# Patient Record
Sex: FEMALE | HISPANIC OR LATINO | Employment: UNEMPLOYED | URBAN - METROPOLITAN AREA
[De-identification: names, ages, dates, MRNs, and addresses within clinical notes are randomized per-mention and may not be internally consistent; named-entity substitution may affect disease eponyms.]

---

## 2024-08-27 ENCOUNTER — DOCUMENTATION ONLY (OUTPATIENT)
Dept: FAMILY MEDICINE | Facility: CLINIC | Age: 54
End: 2024-08-27
Payer: MEDICAID

## 2024-09-18 ENCOUNTER — TELEPHONE (OUTPATIENT)
Dept: FAMILY MEDICINE | Facility: CLINIC | Age: 54
End: 2024-09-18
Payer: MEDICAID

## 2024-09-18 NOTE — TELEPHONE ENCOUNTER
Attempted to reach patient - but phone seems to be disconnected. It rang 1x and then gave a dial tone. If pt calls back, please transfer to Marianna Beltre on 9/18/2024 at 3:36 PM

## 2024-09-20 ENCOUNTER — DOCUMENTATION ONLY (OUTPATIENT)
Dept: FAMILY MEDICINE | Facility: CLINIC | Age: 54
End: 2024-09-20
Payer: MEDICAID

## 2024-09-20 NOTE — PROGRESS NOTES
"Ciera (Clinic Services Supervisor) was contacted by  at about 2:09pm, stating that patient Anais and friend Brayan are here in clinic and requesting to speak with Clinic Manager/Supervisor.      Ciera went out to the lobby and introduced herself  and asked Anais what she could do to help them. Anais explained they were here to get a formed filled out so that her friend Brayan can make legal decisions on her behalf because she feels that she is not able to do it for herself. Ciera let Anais know that there is no form here in clinic that would ed Brayan that kind of authority over her. She let her know that they would need to go through the legal process in order for that to happen. Brayan, who was sitting across from Anais, interrupted Anais and demanded that she look at her while she is talking to her. Brayan complained about the visit from yesterday and how everything that happened yesterday was a \"scam\". Brayan insinuated that JOVANNA Cabral was not doing her job and stated that she felt Marianna influenced Dr. SONIA Oliva's care to Anais. She then stated that other healthcare systems have not made it this \"complicated\" and \"difficult\" for them. She's states that other organization that they have gone to only took \"a simple form\" to be filled out and that granted Brayan to make decisions on Anais's behalf. Ciera told Brayan that she could not speak on other organization or what happened in yesterday's visit, however, it is HIPAA and it is the law.      Ciera redirect the conversation to Anais and then Anais asked about her referral to [facility]. She let Anais know that the referral to [facility] has been entered and will take a few days to process and she would receive a call. Brayan continued to interrupt me and make comments such as \"see, they're very busy. They'll call you when they can. They have a lot of patients to get through\". Brayan then continued to tell me about how her and Anais became apart of each other's lives and listed her " "own medical conditions that included MS, TMJ, and Mental Health problems and why she's fit to make decisions for Anais.      After a few minutes, Ciera apologized for interrupting Brayan's story and redirected the conversation. She informed them once again, without any legal documentation, we would not be able to allow Brayan to make any decisions or talk about Anais's health with Brayan. She told her she understands that she may be trying to help Anais, but at this time, Anais is her own self and so is Brayan. She let her know that would not be repeating herself again. Ciera encouraged them to make a follow up appointment if they would like, however, if they were not going to make a follow up appointment, there was nothing else that we could do to assist them and Ciera asked that they leave. Brayan continued to make comments such as, \"She don't want Marianna or Dr. Oliva. She wants a different doctor.\". Ciera told her that would up to Anais and she can make the appointment at the .      Brayan then accused Ciera of being disrespectful and discriminating to her because of her health and \"kicking them out\". She complained about how she was in pain and that Ciera was not trying to \"understand or sympathize\" with her. She continued by saying that she's never been treated so \"rude\" in her life. She then added that she never offered her to make an appointment and that Ciera was a \"liar\". Cirea corrected Brayan. She let her know that she never disrespected or discriminated her and that she had offered for them to make appointments, if not here in clinic, they are more than welcome to call and schedule. I then ended the conversation and came back to my office.      They both stayed in clinic until about 3:15pm before leaving. Neither of them made an appointment.      Ciera Owen on 8/27/2024 at 4:55 PM  "

## 2024-09-20 NOTE — PROGRESS NOTES
Brayan arrived to our clinic at about 11:15am with her  Anais. Our  saw them arrive on camera and went out to the parking lot to meet them. He informed Brayan that she did not have an appointment today, and so she could not come into the building. She said she wanted to speak to me, so I went out to the parking lot.     I informed Brayan that we would not be taking her on as a patient here and that she could not come into the building today as she did not have an appt. I spoke to Anais and offered to have her come inside with me to discuss her options for remaining a patient. Brayan would not let Anais speak and repeated the behaviors we'd seen in our prior interactions (and documented in Anais's chart). I told both of them that I was cancelling their appts on 10/1 and again informed Anais that if she changed her mind and would like to speak with me 1:1, she could call me any time.     Brayan got back into their vehicle, and they left without further incident.     Anais may not schedule future appts at [place] without meeting 1:1 w/clinic manager.     Marianna Beltre on 9/20/2024 at 11:53 AM

## 2024-09-27 ENCOUNTER — MEDICAL CORRESPONDENCE (OUTPATIENT)
Dept: HEALTH INFORMATION MANAGEMENT | Facility: CLINIC | Age: 54
End: 2024-09-27
Payer: MEDICAID

## 2024-10-14 ENCOUNTER — OFFICE VISIT (OUTPATIENT)
Dept: FAMILY MEDICINE | Facility: CLINIC | Age: 54
End: 2024-10-14
Payer: MEDICAID

## 2024-10-14 VITALS
HEART RATE: 95 BPM | WEIGHT: 180 LBS | HEIGHT: 60 IN | OXYGEN SATURATION: 100 % | BODY MASS INDEX: 35.34 KG/M2 | RESPIRATION RATE: 21 BRPM | TEMPERATURE: 97.6 F | DIASTOLIC BLOOD PRESSURE: 85 MMHG | SYSTOLIC BLOOD PRESSURE: 134 MMHG

## 2024-10-14 DIAGNOSIS — F09 COGNITIVE DISORDER: ICD-10-CM

## 2024-10-14 DIAGNOSIS — Z12.4 CERVICAL CANCER SCREENING: ICD-10-CM

## 2024-10-14 DIAGNOSIS — Z87.828 H/O HEAD INJURY: ICD-10-CM

## 2024-10-14 DIAGNOSIS — R63.8 DIFFICULTY EATING: ICD-10-CM

## 2024-10-14 DIAGNOSIS — Z11.4 SCREENING FOR HIV (HUMAN IMMUNODEFICIENCY VIRUS): ICD-10-CM

## 2024-10-14 DIAGNOSIS — E55.9 VITAMIN D DEFICIENCY: ICD-10-CM

## 2024-10-14 DIAGNOSIS — E11.65 TYPE 2 DIABETES MELLITUS WITH HYPERGLYCEMIA, WITHOUT LONG-TERM CURRENT USE OF INSULIN (H): ICD-10-CM

## 2024-10-14 DIAGNOSIS — M79.10 MYALGIA: ICD-10-CM

## 2024-10-14 DIAGNOSIS — E66.812 CLASS 2 SEVERE OBESITY DUE TO EXCESS CALORIES WITH SERIOUS COMORBIDITY AND BODY MASS INDEX (BMI) OF 35.0 TO 35.9 IN ADULT (H): ICD-10-CM

## 2024-10-14 DIAGNOSIS — Z11.59 NEED FOR HEPATITIS C SCREENING TEST: ICD-10-CM

## 2024-10-14 DIAGNOSIS — R47.9 DIFFICULTY WITH SPEECH: ICD-10-CM

## 2024-10-14 DIAGNOSIS — Z23 ENCOUNTER FOR IMMUNIZATION: ICD-10-CM

## 2024-10-14 DIAGNOSIS — Z11.59 NEED FOR HEPATITIS B SCREENING TEST: ICD-10-CM

## 2024-10-14 DIAGNOSIS — Z78.9 VEGAN: ICD-10-CM

## 2024-10-14 DIAGNOSIS — R06.2 WHEEZING: ICD-10-CM

## 2024-10-14 DIAGNOSIS — R76.8 ELEVATED ANTINUCLEAR ANTIBODY (ANA) LEVEL: ICD-10-CM

## 2024-10-14 DIAGNOSIS — Z13.220 LIPID SCREENING: ICD-10-CM

## 2024-10-14 DIAGNOSIS — Z12.11 SCREEN FOR COLON CANCER: ICD-10-CM

## 2024-10-14 DIAGNOSIS — R13.10 DYSPHAGIA, UNSPECIFIED TYPE: ICD-10-CM

## 2024-10-14 DIAGNOSIS — F90.2 ATTENTION DEFICIT HYPERACTIVITY DISORDER (ADHD), COMBINED TYPE: ICD-10-CM

## 2024-10-14 DIAGNOSIS — M26.609 TMJ (TEMPOROMANDIBULAR JOINT SYNDROME): ICD-10-CM

## 2024-10-14 DIAGNOSIS — E66.01 CLASS 2 SEVERE OBESITY DUE TO EXCESS CALORIES WITH SERIOUS COMORBIDITY AND BODY MASS INDEX (BMI) OF 35.0 TO 35.9 IN ADULT (H): ICD-10-CM

## 2024-10-14 DIAGNOSIS — R51.9 CHRONIC DAILY HEADACHE: ICD-10-CM

## 2024-10-14 DIAGNOSIS — Z28.21 NO VACCINATION-PT REFUSE: ICD-10-CM

## 2024-10-14 DIAGNOSIS — Z12.31 VISIT FOR SCREENING MAMMOGRAM: Primary | ICD-10-CM

## 2024-10-14 DIAGNOSIS — F39 MOOD DISORDER (H): ICD-10-CM

## 2024-10-14 LAB
BASOPHILS # BLD AUTO: 0 10E3/UL (ref 0–0.2)
BASOPHILS NFR BLD AUTO: 0 %
EOSINOPHIL # BLD AUTO: 0.1 10E3/UL (ref 0–0.7)
EOSINOPHIL NFR BLD AUTO: 1 %
ERYTHROCYTE [DISTWIDTH] IN BLOOD BY AUTOMATED COUNT: 11.4 % (ref 10–15)
ERYTHROCYTE [SEDIMENTATION RATE] IN BLOOD BY WESTERGREN METHOD: 17 MM/HR (ref 0–30)
EST. AVERAGE GLUCOSE BLD GHB EST-MCNC: 220 MG/DL
HBA1C MFR BLD: 9.3 % (ref 0–5.6)
HCT VFR BLD AUTO: 42.5 % (ref 35–47)
HGB BLD-MCNC: 14.5 G/DL (ref 11.7–15.7)
IMM GRANULOCYTES # BLD: 0 10E3/UL
IMM GRANULOCYTES NFR BLD: 0 %
LYMPHOCYTES # BLD AUTO: 2.4 10E3/UL (ref 0.8–5.3)
LYMPHOCYTES NFR BLD AUTO: 27 %
MCH RBC QN AUTO: 28.4 PG (ref 26.5–33)
MCHC RBC AUTO-ENTMCNC: 34.1 G/DL (ref 31.5–36.5)
MCV RBC AUTO: 83 FL (ref 78–100)
MONOCYTES # BLD AUTO: 0.5 10E3/UL (ref 0–1.3)
MONOCYTES NFR BLD AUTO: 5 %
NEUTROPHILS # BLD AUTO: 6 10E3/UL (ref 1.6–8.3)
NEUTROPHILS NFR BLD AUTO: 67 %
PLATELET # BLD AUTO: 320 10E3/UL (ref 150–450)
RBC # BLD AUTO: 5.11 10E6/UL (ref 3.8–5.2)
WBC # BLD AUTO: 9 10E3/UL (ref 4–11)

## 2024-10-14 PROCEDURE — 84165 PROTEIN E-PHORESIS SERUM: CPT | Performed by: PATHOLOGY

## 2024-10-14 PROCEDURE — 84443 ASSAY THYROID STIM HORMONE: CPT | Performed by: FAMILY MEDICINE

## 2024-10-14 PROCEDURE — 85025 COMPLETE CBC W/AUTO DIFF WBC: CPT | Performed by: FAMILY MEDICINE

## 2024-10-14 PROCEDURE — 86803 HEPATITIS C AB TEST: CPT | Performed by: FAMILY MEDICINE

## 2024-10-14 PROCEDURE — 82607 VITAMIN B-12: CPT | Performed by: FAMILY MEDICINE

## 2024-10-14 PROCEDURE — 86225 DNA ANTIBODY NATIVE: CPT | Performed by: FAMILY MEDICINE

## 2024-10-14 PROCEDURE — 36415 COLL VENOUS BLD VENIPUNCTURE: CPT | Performed by: FAMILY MEDICINE

## 2024-10-14 PROCEDURE — 83001 ASSAY OF GONADOTROPIN (FSH): CPT | Performed by: FAMILY MEDICINE

## 2024-10-14 PROCEDURE — 86235 NUCLEAR ANTIGEN ANTIBODY: CPT | Mod: 59 | Performed by: FAMILY MEDICINE

## 2024-10-14 PROCEDURE — 86431 RHEUMATOID FACTOR QUANT: CPT | Performed by: FAMILY MEDICINE

## 2024-10-14 PROCEDURE — 87389 HIV-1 AG W/HIV-1&-2 AB AG IA: CPT | Performed by: FAMILY MEDICINE

## 2024-10-14 PROCEDURE — 82550 ASSAY OF CK (CPK): CPT | Performed by: FAMILY MEDICINE

## 2024-10-14 PROCEDURE — 86235 NUCLEAR ANTIGEN ANTIBODY: CPT | Performed by: FAMILY MEDICINE

## 2024-10-14 PROCEDURE — 83036 HEMOGLOBIN GLYCOSYLATED A1C: CPT | Performed by: FAMILY MEDICINE

## 2024-10-14 PROCEDURE — 82306 VITAMIN D 25 HYDROXY: CPT | Performed by: FAMILY MEDICINE

## 2024-10-14 PROCEDURE — 86039 ANTINUCLEAR ANTIBODIES (ANA): CPT | Performed by: FAMILY MEDICINE

## 2024-10-14 PROCEDURE — 86200 CCP ANTIBODY: CPT | Performed by: FAMILY MEDICINE

## 2024-10-14 PROCEDURE — 80053 COMPREHEN METABOLIC PANEL: CPT | Performed by: FAMILY MEDICINE

## 2024-10-14 PROCEDURE — 86706 HEP B SURFACE ANTIBODY: CPT | Performed by: FAMILY MEDICINE

## 2024-10-14 PROCEDURE — 86140 C-REACTIVE PROTEIN: CPT | Performed by: FAMILY MEDICINE

## 2024-10-14 PROCEDURE — 84155 ASSAY OF PROTEIN SERUM: CPT | Mod: 59 | Performed by: FAMILY MEDICINE

## 2024-10-14 PROCEDURE — 85652 RBC SED RATE AUTOMATED: CPT | Performed by: FAMILY MEDICINE

## 2024-10-14 PROCEDURE — 80061 LIPID PANEL: CPT | Performed by: FAMILY MEDICINE

## 2024-10-14 PROCEDURE — 99204 OFFICE O/P NEW MOD 45 MIN: CPT | Performed by: FAMILY MEDICINE

## 2024-10-14 PROCEDURE — 86780 TREPONEMA PALLIDUM: CPT | Performed by: FAMILY MEDICINE

## 2024-10-14 PROCEDURE — 86038 ANTINUCLEAR ANTIBODIES: CPT | Performed by: FAMILY MEDICINE

## 2024-10-14 ASSESSMENT — ANXIETY QUESTIONNAIRES
4. TROUBLE RELAXING: SEVERAL DAYS
3. WORRYING TOO MUCH ABOUT DIFFERENT THINGS: MORE THAN HALF THE DAYS
5. BEING SO RESTLESS THAT IT IS HARD TO SIT STILL: SEVERAL DAYS
IF YOU CHECKED OFF ANY PROBLEMS ON THIS QUESTIONNAIRE, HOW DIFFICULT HAVE THESE PROBLEMS MADE IT FOR YOU TO DO YOUR WORK, TAKE CARE OF THINGS AT HOME, OR GET ALONG WITH OTHER PEOPLE: SOMEWHAT DIFFICULT
6. BECOMING EASILY ANNOYED OR IRRITABLE: SEVERAL DAYS
GAD7 TOTAL SCORE: 10
8. IF YOU CHECKED OFF ANY PROBLEMS, HOW DIFFICULT HAVE THESE MADE IT FOR YOU TO DO YOUR WORK, TAKE CARE OF THINGS AT HOME, OR GET ALONG WITH OTHER PEOPLE?: SOMEWHAT DIFFICULT
7. FEELING AFRAID AS IF SOMETHING AWFUL MIGHT HAPPEN: SEVERAL DAYS
GAD7 TOTAL SCORE: 10
1. FEELING NERVOUS, ANXIOUS, OR ON EDGE: MORE THAN HALF THE DAYS
7. FEELING AFRAID AS IF SOMETHING AWFUL MIGHT HAPPEN: SEVERAL DAYS
2. NOT BEING ABLE TO STOP OR CONTROL WORRYING: MORE THAN HALF THE DAYS
GAD7 TOTAL SCORE: 10

## 2024-10-14 ASSESSMENT — PATIENT HEALTH QUESTIONNAIRE - PHQ9
SUM OF ALL RESPONSES TO PHQ QUESTIONS 1-9: 16
10. IF YOU CHECKED OFF ANY PROBLEMS, HOW DIFFICULT HAVE THESE PROBLEMS MADE IT FOR YOU TO DO YOUR WORK, TAKE CARE OF THINGS AT HOME, OR GET ALONG WITH OTHER PEOPLE: VERY DIFFICULT
SUM OF ALL RESPONSES TO PHQ QUESTIONS 1-9: 16
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
## 2024-10-14 NOTE — LETTER
My Depression Action Plan  Name: Brayan Baires   Date of Birth 1970  Date: 10/15/2024    My doctor: Carolynn Correa   My clinic: [facility] [address] [place] [id number] [phone]            GREEN    ZONE   Good Control    What it looks like:   Things are going generally well. You have normal ups and downs. You may even feel depressed from time to time, but bad moods usually last less than a day.   What you need to do:  Continue to care for yourself (see self care plan)  Check your depression survival kit and update it as needed  Follow your physician s recommendations including any medication.  Do not stop taking medication unless you consult with your physician first.             YELLOW         ZONE Getting Worse    What it looks like:   Depression is starting to interfere with your life.   It may be hard to get out of bed; you may be starting to isolate yourself from others.  Symptoms of depression are starting to last most all day and this has happened for several days.   You may have suicidal thoughts but they are not constant.   What you need to do:     Call your care team. Your response to treatment will improve if you keep your care team informed of your progress. Yellow periods are signs an adjustment may need to be made.     Continue your self-care.  Just get dressed and ready for the day.  Don't give yourself time to talk yourself out of it.    Talk to someone in your support network.    Open up your Depression Self-Care Plan/Wellness Kit.             RED    ZONE Medical Alert - Get Help    What it looks like:   Depression is seriously interfering with your life.   You may experience these or other symptoms: You can t get out of bed most days, can t work or engage in other necessary activities, you have trouble taking care of basic hygiene, or basic responsibilities, thoughts of suicide or death that will not go away, self-injurious behavior.      What you need to do:  Call your care team and request a same-day appointment. If they are not available (weekends or after hours) call your local crisis line, emergency room or 911.          Depression Self-Care Plan / Wellness Kit    Many people find that medication and therapy are helpful treatments for managing depression. In addition, making small changes to your everyday life can help to boost your mood and improve your wellbeing. Below are some tips for you to consider. Be sure to talk with your medical provider and/or behavioral health consultant if your symptoms are worsening or not improving.     Sleep   Sleep hygiene  means all of the habits that support good, restful sleep. It includes maintaining a consistent bedtime and wake time, using your bedroom only for sleeping or sex, and keeping the bedroom dark and free of distractions like a computer, smartphone, or television.     Develop a Healthy Routine  Maintain good hygiene. Get out of bed in the morning, make your bed, brush your teeth, take a shower, and get dressed. Don t spend too much time viewing media that makes you feel stressed. Find time to relax each day.    Exercise  Get some form of exercise every day. This will help reduce pain and release endorphins, the  feel good  chemicals in your brain. It can be as simple as just going for a walk or doing some gardening, anything that will get you moving.      Diet  Strive to eat healthy foods, including fruits and vegetables. Drink plenty of water. Avoid excessive sugar, caffeine, alcohol, and other mood-altering substances.     Stay Connected with Others  Stay in touch with friends and family members.    Manage Your Mood  Try deep breathing, massage therapy, biofeedback, or meditation. Take part in fun activities when you can. Try to find something to smile about each day.     Psychotherapy  Be open to working with a therapist if your provider recommends it.     Medication  Be sure to take your  medication as prescribed. Most anti-depressants need to be taken every day. It usually takes several weeks for medications to work. Not all medicines work for all people. It is important to follow-up with your provider to make sure you have a treatment plan that is working for you. Do not stop your medication abruptly without first discussing it with your provider.    Crisis Resources   These hotlines are for both adults and children. They and are open 24 hours a day, 7 days a week unless noted otherwise.    National Suicide Prevention Lifeline   988 or 5-752-716-IIAT (8774)    Crisis Text Line    www.crisistextline.org  Text HOME to 150446 from anywhere in the [place], anytime, about any type of crisis. A live, trained crisis counselor will receive the text and respond quickly.    [place] Lifeline for LGBTQ Youth  A national crisis intervention and suicide lifeline for LGBTQ youth under 25. Provides a safe place to talk without judgement. Call 1-566.435.7740; text START to 615780 or visit www.[facility].org to talk to a trained counselor.    For [facility] crisis numbers, visit the [facility] website at:  https://[place].gov/dhs/people-we-serve/adults/health-care/mental-health/resources/crisis-contacts.jsp

## 2024-10-14 NOTE — LETTER
October 25, 2024      Brayan Baires  [address]   [place] [address]        Dear ,    We are writing to inform you of your test results.    Your tests show that you have Diabetes- please be sure to contact us so that we can help you get this under control so you feel better.     Your cholesterol is a little high    You are NOT IMMUNE to hepatitis B     Your B12 is on the low end of normal- starting a B complex may help you feel better.      Your vitamin D is low- be sure to start a daily vitamin D to help your body feel better.  1000units should be good.      Other tests all look normal.     Resulted Orders   HIV Antigen Antibody Combo   Result Value Ref Range    HIV Antigen Antibody Combo Nonreactive Nonreactive      Comment:      Negative HIV-1 p24 antigen and HIV-1/2 antibody screening test results usually indicate the absence of HIV-1 and HIV-2 infection. However, such negative results do not rule-out acute HIV infection.  If acute HIV-1 or HIV-2 infection is suspected, detection of HIV-1 or HIV-2 RNA  is recommended. This result is obtained using the Roche Elecsys HIV Duo method on the hamzah e801 immunoassay analyzer.   Hepatitis C Screen Reflex to HCV RNA Quant and Genotype   Result Value Ref Range    Hepatitis C Antibody Nonreactive Nonreactive      Comment:      A nonreactive screening test result does not exclude the possibility of exposure to or infection with HCV. Nonreactive screening test results in individuals with prior exposure to HCV may be due to antibody levels below the limit of detection of this assay or lack of reactivity to the HCV antigens used in this assay. Patients with recent HCV infections (<3 months from time of exposure) may have false-negative HCV antibody results due to the time needed for seroconversion (average of 8 to 9 weeks).   Lipid panel reflex to direct LDL Non-fasting   Result Value Ref Range    Cholesterol 208 (H) <200 mg/dL    Triglycerides 109 <150  mg/dL    Direct Measure HDL 45 (L) >=50 mg/dL    LDL Cholesterol Calculated 141 (H) <100 mg/dL    Non HDL Cholesterol 163 (H) <130 mg/dL    Patient Fasting > 8hrs? No     Narrative    Cholesterol  Desirable: < 200 mg/dL  Borderline High: 200 - 239 mg/dL  High: >= 240 mg/dL    Triglycerides  Normal: < 150 mg/dL  Borderline High: 150 - 199 mg/dL  High: 200-499 mg/dL  Very High: >= 500 mg/dL    Direct Measure HDL  Female: >= 50 mg/dL   Male: >= 40 mg/dL    LDL Cholesterol  Desirable: < 100 mg/dL  Above Desirable: 100 - 129 mg/dL   Borderline High: 130 - 159 mg/dL   High:  160 - 189 mg/dL   Very High: >= 190 mg/dL    Non HDL Cholesterol  Desirable: < 130 mg/dL  Above Desirable: 130 - 159 mg/dL  Borderline High: 160 - 189 mg/dL  High: 190 - 219 mg/dL  Very High: >= 220 mg/dL   Comprehensive metabolic panel (BMP + Alb, Alk Phos, ALT, AST, Total. Bili, TP)   Result Value Ref Range    Sodium 138 135 - 145 mmol/L    Potassium 4.0 3.4 - 5.3 mmol/L    Carbon Dioxide (CO2) 21 (L) 22 - 29 mmol/L    Anion Gap 14 7 - 15 mmol/L    Urea Nitrogen 11.8 6.0 - 20.0 mg/dL    Creatinine 0.44 (L) 0.51 - 0.95 mg/dL    GFR Estimate >90 >60 mL/min/1.73m2      Comment:      eGFR calculated using 2021 CKD-EPI equation.    Calcium 9.9 8.8 - 10.4 mg/dL      Comment:      Reference intervals for this test were updated on 7/16/2024 to reflect our healthy population more accurately. There may be differences in the flagging of prior results with similar values performed with this method. Those prior results can be interpreted in the context of the updated reference intervals.    Chloride 103 98 - 107 mmol/L    Glucose 236 (H) 70 - 99 mg/dL    Alkaline Phosphatase 93 40 - 150 U/L    AST 25 0 - 45 U/L    ALT 28 0 - 50 U/L    Protein Total 7.7 6.4 - 8.3 g/dL    Albumin 4.4 3.5 - 5.2 g/dL    Bilirubin Total 0.5 <=1.2 mg/dL    Patient Fasting > 8hrs? No    Hepatitis B Surface Antibody   Result Value Ref Range    Hepatitis B Surface Antibody Nonreactive        Comment:      Nonreactive results, defined as anti-HBs levels of less than 8.5 mIU/mL, indicate a lack of recovery from acute or chronic hepatitis B or inadequate immune response to HBV vaccination.    Hepatitis B Surface Antibody Instrument Value <3.50 <8.5 m[IU]/mL   ESR: Erythrocyte sedimentation rate   Result Value Ref Range    Erythrocyte Sedimentation Rate 17 0 - 30 mm/hr   CRP, inflammation   Result Value Ref Range    CRP Inflammation 12.10 (H) <5.00 mg/L   Anti Nuclear Gregoria IgG by IFA with Reflex   Result Value Ref Range    ASHLEY interpretation Borderline Positive (A) Negative      Comment:        Negative:              <1:40  Borderline Positive:   1:40 - 1:80  Positive:              >1:80    ASHLEY pattern 1 Speckled     ASHLEY titer 1 1:40    TSH with free T4 reflex   Result Value Ref Range    TSH 1.70 0.30 - 4.20 uIU/mL   Vitamin B12   Result Value Ref Range    Vitamin B12 342 232 - 1,245 pg/mL   Treponema Abs w Reflex to RPR and Titer   Result Value Ref Range    Treponema Antibody Total Nonreactive Nonreactive   Hemoglobin A1c   Result Value Ref Range    Estimated Average Glucose 220 (H) <117 mg/dL    Hemoglobin A1C 9.3 (H) 0.0 - 5.6 %      Comment:      Normal <5.7%   Prediabetes 5.7-6.4%    Diabetes 6.5% or higher     Note: Adopted from ADA consensus guidelines.    Narrative    Results confirmed by repeat test.    CBC with platelets and differential   Result Value Ref Range    WBC Count 9.0 4.0 - 11.0 10e3/uL    RBC Count 5.11 3.80 - 5.20 10e6/uL    Hemoglobin 14.5 11.7 - 15.7 g/dL    Hematocrit 42.5 35.0 - 47.0 %    MCV 83 78 - 100 fL    MCH 28.4 26.5 - 33.0 pg    MCHC 34.1 31.5 - 36.5 g/dL    RDW 11.4 10.0 - 15.0 %    Platelet Count 320 150 - 450 10e3/uL    % Neutrophils 67 %    % Lymphocytes 27 %    % Monocytes 5 %    % Eosinophils 1 %    % Basophils 0 %    % Immature Granulocytes 0 %    Absolute Neutrophils 6.0 1.6 - 8.3 10e3/uL    Absolute Lymphocytes 2.4 0.8 - 5.3 10e3/uL    Absolute Monocytes 0.5  0.0 - 1.3 10e3/uL    Absolute Eosinophils 0.1 0.0 - 0.7 10e3/uL    Absolute Basophils 0.0 0.0 - 0.2 10e3/uL    Absolute Immature Granulocytes 0.0 <=0.4 10e3/uL   SSB La JANETH Antibody IgG   Result Value Ref Range    SSB Gregoria IgG Instrument Value <0.6 <7.0 U/mL    SSB (La) Antibody IgG Negative Negative   SSA Ro JANETH Antibody IgG   Result Value Ref Range    SSA Gregoria IgG Instrument Value <0.5 <7.0 U/mL    SSA (Ro) Antibody IgG Negative Negative   Smith JANETH Antibody IgG   Result Value Ref Range    Mcpherson JANETH Gregoria IgG Instrument Value <0.7 <7.0 U/mL    Smith JANETH Antibody IgG Negative Negative   Rheumatoid factor   Result Value Ref Range    Rheumatoid Factor <10 <14 IU/mL   CK total   Result Value Ref Range    CK 48 26 - 192 U/L   Scleroderma Antibody Scl70 JANETH IgG   Result Value Ref Range    Scl-70 Gregoria IgG Instrument Value <0.6 <7.0 U/mL    Scl-70 Antibody IgG Negative Negative   Cyclic Citrullinated Peptide Antibody IgG   Result Value Ref Range    Cyclic Citrullinated Peptide Antibody IgG <0.4 <7.0 U/mL      Comment:      Negative   DNA double stranded antibodies   Result Value Ref Range    DNA (ds) Antibody <0.6 <10.0 IU/mL      Comment:      Negative    Narrative    Negative:  Less than 10  Equivocal: 10-15  Positive:  Greater than 15   Follicle stimulating hormone   Result Value Ref Range    FSH 42.2 mIU/mL      Comment:      19 years and older:   Follicular phase: 3.5-12.5 mIU/mL   Ovulation phase: 4.7-21.5 mIU/mL   Luteal phase: 1.7-7.7 mIU/mL   Postmenopause: 25.8-134.8 mIU/mL      Total Protein, Serum for ELP   Result Value Ref Range    Total Protein Serum for ELP 7.4 6.4 - 8.3 g/dL   Protein Electrophoresis, Serum   Result Value Ref Range    Albumin 4.4 3.7 - 5.1 g/dL    Alpha 1 0.3 0.2 - 0.4 g/dL    Alpha 2 0.8 0.5 - 0.9 g/dL    Beta Globulin 1.1 (H) 0.6 - 1.0 g/dL    Gamma Globulin 0.9 0.7 - 1.6 g/dL    Monoclonal Peak 0.0 <=0.0 g/dL    ELP Interpretation       Essentially normal electrophoretic pattern. No obvious  monoclonal proteins seen. Pathologic significance requires clinical correlation. Suad Jones M.D.   Vitamin D Deficiency   Result Value Ref Range    Vitamin D, Total (25-Hydroxy) 18 (L) 20 - 50 ng/mL      Comment:      mild to moderate deficiency    Narrative    Season, race, dietary intake, and treatment affect the concentration of 25-hydroxy-Vitamin D. Values may decrease during winter months and increase during summer months.    Vitamin D determination is routinely performed by an immunoassay specific for 25 hydroxyvitamin D3.  If an individual is on vitamin D2(ergocalciferol) supplementation, please specify 25 OH vitamin D2 and D3 level determination by LCMSMS test VITD23.         If you have any questions or concerns, please call the clinic at the number listed above.       Sincerely,      Carolynn Correa MD

## 2024-10-15 ENCOUNTER — PATIENT OUTREACH (OUTPATIENT)
Dept: CARE COORDINATION | Facility: CLINIC | Age: 54
End: 2024-10-15
Payer: MEDICAID

## 2024-10-15 ENCOUNTER — TELEPHONE (OUTPATIENT)
Dept: FAMILY MEDICINE | Facility: CLINIC | Age: 54
End: 2024-10-15
Payer: MEDICAID

## 2024-10-15 PROBLEM — R51.9 CHRONIC DAILY HEADACHE: Status: ACTIVE | Noted: 2024-10-15

## 2024-10-15 PROBLEM — E66.01 CLASS 2 SEVERE OBESITY DUE TO EXCESS CALORIES WITH SERIOUS COMORBIDITY IN ADULT (H): Status: ACTIVE | Noted: 2024-10-15

## 2024-10-15 PROBLEM — F39 MOOD DISORDER (H): Status: ACTIVE | Noted: 2024-10-15

## 2024-10-15 PROBLEM — R06.2 WHEEZING: Status: ACTIVE | Noted: 2024-10-15

## 2024-10-15 PROBLEM — E11.65 TYPE 2 DIABETES MELLITUS WITH HYPERGLYCEMIA, WITHOUT LONG-TERM CURRENT USE OF INSULIN (H): Status: ACTIVE | Noted: 2024-10-15

## 2024-10-15 PROBLEM — M79.10 MYALGIA: Status: ACTIVE | Noted: 2024-10-15

## 2024-10-15 PROBLEM — R13.10 DYSPHAGIA: Status: ACTIVE | Noted: 2024-10-15

## 2024-10-15 PROBLEM — E66.812 CLASS 2 SEVERE OBESITY DUE TO EXCESS CALORIES WITH SERIOUS COMORBIDITY IN ADULT (H): Status: ACTIVE | Noted: 2024-10-15

## 2024-10-15 PROBLEM — R76.8 ELEVATED ANTINUCLEAR ANTIBODY (ANA) LEVEL: Status: ACTIVE | Noted: 2024-10-15

## 2024-10-15 PROBLEM — Z28.21 NO VACCINATION-PT REFUSE: Status: ACTIVE | Noted: 2024-10-15

## 2024-10-15 LAB
ALBUMIN SERPL BCG-MCNC: 4.4 G/DL (ref 3.5–5.2)
ALP SERPL-CCNC: 93 U/L (ref 40–150)
ALT SERPL W P-5'-P-CCNC: 28 U/L (ref 0–50)
ANA PAT SER IF-IMP: ABNORMAL
ANA SER QL IF: ABNORMAL
ANA TITR SER IF: ABNORMAL {TITER}
ANION GAP SERPL CALCULATED.3IONS-SCNC: 14 MMOL/L (ref 7–15)
AST SERPL W P-5'-P-CCNC: 25 U/L (ref 0–45)
BILIRUB SERPL-MCNC: 0.5 MG/DL
BUN SERPL-MCNC: 11.8 MG/DL (ref 6–20)
CALCIUM SERPL-MCNC: 9.9 MG/DL (ref 8.8–10.4)
CHLORIDE SERPL-SCNC: 103 MMOL/L (ref 98–107)
CHOLEST SERPL-MCNC: 208 MG/DL
CK SERPL-CCNC: 48 U/L (ref 26–192)
CREAT SERPL-MCNC: 0.44 MG/DL (ref 0.51–0.95)
CRP SERPL-MCNC: 12.1 MG/L
EGFRCR SERPLBLD CKD-EPI 2021: >90 ML/MIN/1.73M2
FASTING STATUS PATIENT QL REPORTED: NO
FASTING STATUS PATIENT QL REPORTED: NO
FSH SERPL IRP2-ACNC: 42.2 MIU/ML
GLUCOSE SERPL-MCNC: 236 MG/DL (ref 70–99)
HBV SURFACE AB SERPL IA-ACNC: <3.5 M[IU]/ML
HBV SURFACE AB SERPL IA-ACNC: NONREACTIVE M[IU]/ML
HCO3 SERPL-SCNC: 21 MMOL/L (ref 22–29)
HCV AB SERPL QL IA: NONREACTIVE
HDLC SERPL-MCNC: 45 MG/DL
HIV 1+2 AB+HIV1 P24 AG SERPL QL IA: NONREACTIVE
LDLC SERPL CALC-MCNC: 141 MG/DL
NONHDLC SERPL-MCNC: 163 MG/DL
POTASSIUM SERPL-SCNC: 4 MMOL/L (ref 3.4–5.3)
PROT SERPL-MCNC: 7.7 G/DL (ref 6.4–8.3)
RHEUMATOID FACT SERPL-ACNC: <10 IU/ML
SODIUM SERPL-SCNC: 138 MMOL/L (ref 135–145)
T PALLIDUM AB SER QL: NONREACTIVE
TOTAL PROTEIN SERUM FOR ELP: 7.4 G/DL (ref 6.4–8.3)
TRIGL SERPL-MCNC: 109 MG/DL
TSH SERPL DL<=0.005 MIU/L-ACNC: 1.7 UIU/ML (ref 0.3–4.2)
VIT B12 SERPL-MCNC: 342 PG/ML (ref 232–1245)
VIT D+METAB SERPL-MCNC: 18 NG/ML (ref 20–50)

## 2024-10-15 RX ORDER — LANCETS
EACH MISCELLANEOUS
Qty: 100 EACH | Refills: 6 | Status: SHIPPED | OUTPATIENT
Start: 2024-10-15

## 2024-10-15 NOTE — TELEPHONE ENCOUNTER
----- Message from Carolynn Correa sent at 10/15/2024 12:28 PM CDT -----  Team - please call patient with results and send result letter with this information if patient desires for their records. Refer to [facility] result note as needed.      Her sugars are VERY high- she has diabetes.   I ordered appointment with our diabetes educator- hopefully she can meet with her this month.    I ordered a glucose meter to start checking once a day alternating between fasting and bedtime     A couple of her tests is a little high- I am going to check for autoimmune problems causing her symptoms.  Maybe her body is attacking her muscles, joints, throat causing problems for her.      I ordered xray of her back and chest based on some labs- please help schedule at clinic     I would like her to see an eye doctor- this was ordered.     I would like her to see ENT about her throat and TMJ to start with.      Her kidney and liver tests are healthy.     Normal thyroid level     Normal blood counts- no anemia     She is NOT IMMUNE to hepatitis B and should start this 3 shot series to better protect her body.

## 2024-10-15 NOTE — TELEPHONE ENCOUNTER
First attempt: Phone number not accepting calls, no option to leave voicemail/callback number.    Please make additional attempts to contact patient at a future time.    Shyanne Woodall RN  [facility]

## 2024-10-15 NOTE — LETTER
[facility]  [address] [facility] [address]    October 24, 2024    Brayan Baires  [address]   [place] [address]      Dear Brayan,    I am a clinic community health worker who works with Carolynn Correa MD with the [facility]. I have been trying to reach you recently to introduce Clinic Care Coordination. Below is a description of clinic care coordination and how I can further assist you.       The clinic care coordination team is made up of a registered nurse, , financial resource worker and community health worker who understand the health care system. The goal of clinic care coordination is to help you manage your health and improve access to the health care system. Our team works alongside your provider to assist you in determining your health and social needs. We can help you obtain health care and community resources, providing you with necessary information and education. We can work with you through any barriers and develop a care plan that helps coordinate and strengthen the communication between you and your care team.  Our services are voluntary and are offered without charge to you personally.    Please feel free to contact me with any questions or concerns regarding care coordination and what we can offer.      We are focused on providing you with the highest-quality healthcare experience possible.    Sincerely,     Isael Ledesma  Community Health Worker  [facility] Care Coordination  [email or web address]  [email or web address]   Office: [phone]  Fax: [phone]

## 2024-10-15 NOTE — RESULT ENCOUNTER NOTE
Team - please call patient with results and send result letter with this information if patient desires for their records. Refer to [place] [facility] result note as needed.      Her sugars are VERY high- she has diabetes.   I ordered appointment with our diabetes educator- hopefully she can meet with her this month.    I ordered a glucose meter to start checking once a day alternating between fasting and bedtime     A couple of her tests is a little high- I am going to check for autoimmune problems causing her symptoms.  Maybe her body is attacking her muscles, joints, throat causing problems for her.      I ordered xray of her back and chest based on some labs- please help schedule at clinic     I would like her to see an eye doctor- this was ordered.     I would like her to see ENT about her throat and TMJ to start with.      Her kidney and liver tests are healthy.     Normal thyroid level     Normal blood counts- no anemia     She is NOT IMMUNE to hepatitis B and should start this 3 shot series to better protect her body.

## 2024-10-15 NOTE — Clinical Note
ZACH Unable to connect with pt to discuss CCC. Phone went straight to busy signal. Pt has an appt on 11-14-24 for est care at [facility] with Dr. Kessler  Order for Care Management has been closed, no further outreach will be done at this time and patient can be re-referred.

## 2024-10-15 NOTE — PROGRESS NOTES
10/15/2024  Clinic Care Coordination Contact  [facility]/[place]    Clinical Data: Care Coordinator Outreach    Outreach Documentation Number of Outreach Attempt   10/15/2024   4:30 PM 1     PCP referral:  Complex Medical Concerns/Education     Financial Support    Patient/Caregiver Support    Resources for Transportation    SDOH Concern    Mental Wellness (Health) (Mental Illness/Chemical Dependency)   Complex Medical Concerns: Chronic Diagnosis - Use Comments    Compliance with medical treatment plan   Financial Support: Food    Housing    Insurance    Medication Affordability   Mental Wellness: Resources of Behavioral Health Services   Patient/Caregiver Suport: Home Safety    Resources for Support     Clinical Data: Care Coordinator Outreach: Discuss CCC enrollment   Outreach attempted x 1.  No answer. Phone went to busy signal.     Plan: Care Coordinator will try to reach patient again in 1-3 business days.    CHW Outreach: 2nd attempt 10-16-24    Isael Ledesma  Community Health Worker  [facility] Care Coordination  [email or web address]   Office: [phone]  Fax: [phone]

## 2024-10-15 NOTE — LETTER
October 18, 2024      Brayan Baires  [address]   [place] [address]        Dear Brayan,     Your sugars are VERY high- you have diabetes.     Dr. Correa an ordered appointment with our diabetes educator- hopefully you can meet with her this month.      Dr. Correa also ordered a glucose meter to start checking once a day alternating between fasting and bedtime.     A couple of your tests are a little high- Dr. Correa is going to check for autoimmune problems causing your symptoms. Maybe your body is attacking your muscles, joints, throat causing problems for you.       Dr. Correa ordered xray of your back and chest based on some labs- please schedule at the clinic by calling [phone].     Dr. Correa would like you to see an eye doctor- this was ordered.      Dr. Correa would like you to see ENT about your throat and TMJ to start with.       Your kidney and liver tests are healthy.      Normal thyroid level      Normal blood counts- no anemia      You are NOT IMMUNE to hepatitis B and should start this 3 shot series to better protect your body.       Sincerely,    Marcos KAN RN / Carolynn Correa MD

## 2024-10-16 ENCOUNTER — TELEPHONE (OUTPATIENT)
Dept: FAMILY MEDICINE | Facility: CLINIC | Age: 54
End: 2024-10-16
Payer: MEDICAID

## 2024-10-16 PROBLEM — E55.9 VITAMIN D DEFICIENCY: Status: ACTIVE | Noted: 2024-10-16

## 2024-10-16 LAB
ALBUMIN SERPL ELPH-MCNC: 4.4 G/DL (ref 3.7–5.1)
ALPHA1 GLOB SERPL ELPH-MCNC: 0.3 G/DL (ref 0.2–0.4)
ALPHA2 GLOB SERPL ELPH-MCNC: 0.8 G/DL (ref 0.5–0.9)
B-GLOBULIN SERPL ELPH-MCNC: 1.1 G/DL (ref 0.6–1)
GAMMA GLOB SERPL ELPH-MCNC: 0.9 G/DL (ref 0.7–1.6)
M PROTEIN SERPL ELPH-MCNC: 0 G/DL
PROT PATTERN SERPL ELPH-IMP: ABNORMAL

## 2024-10-16 RX ORDER — ERGOCALCIFEROL 1.25 MG/1
50000 CAPSULE, LIQUID FILLED ORAL WEEKLY
Qty: 12 CAPSULE | Refills: 4 | Status: SHIPPED | OUTPATIENT
Start: 2024-10-16

## 2024-10-16 NOTE — PROGRESS NOTES
10/16/2024  Clinic Care Coordination Contact  [facility]/[place]    Clinical Data: Care Coordinator Outreach    Outreach Documentation Number of Outreach Attempt   10/15/2024   4:30 PM 1   10/16/2024  11:44 AM 2     PCP referral:  Complex Medical Concerns/Education     Financial Support    Patient/Caregiver Support    Resources for Transportation    SDOH Concern    Mental Wellness (Health) (Mental Illness/Chemical Dependency)   Complex Medical Concerns: Chronic Diagnosis - Use Comments    Compliance with medical treatment plan   Financial Support: Food    Housing    Insurance    Medication Affordability   Mental Wellness: Resources of Behavioral Health Services   Patient/Caregiver Suport: Home Safety    Resources for Support     Clinical Data: Care Coordinator Outreach: Discuss CCC enrollment    Outreach attempted x 2.  No answer. Phone went to busy signal.     Plan: Care Coordinator will send care coordination introduction letter with care coordinator contact information and explanation of care coordination services via mail.    Order for Care Management has been closed, no further outreach will be done at this time and patient can be re-referred.     Routed to PCP as ZACH Ledesma  Community Health Worker  [facility] Care Coordination  [email or web address]   Office: [phone]  Fax: [phone]

## 2024-10-16 NOTE — TELEPHONE ENCOUNTER
MTM referral from: [facility] visit (referral by provider)    MTM referral outreach attempt #1 on October 16, 2024 at 12:01 PM      Outcome: No Answer- number not ins ervice     Use medicaid  for the carrier/Plan on the flowsheet      MTM Practitioner please send patient letter    Vaishnavi Polanco - [facility]    [phone]

## 2024-10-16 NOTE — TELEPHONE ENCOUNTER
Second attempt: No answer. Phone is not accepting calls. Unable to LVM. Will try again later.     Marcos KAN RN  [facility]

## 2024-10-17 LAB
CCP AB SER IA-ACNC: <0.4 U/ML
DSDNA AB SER-ACNC: <0.6 IU/ML
ENA SM IGG SER IA-ACNC: <0.7 U/ML
ENA SM IGG SER IA-ACNC: NEGATIVE
ENA SS-A AB SER IA-ACNC: <0.5 U/ML
ENA SS-A AB SER IA-ACNC: NEGATIVE
ENA SS-B IGG SER IA-ACNC: <0.6 U/ML
ENA SS-B IGG SER IA-ACNC: NEGATIVE

## 2024-10-18 NOTE — TELEPHONE ENCOUNTER
Third attempt: No answer. Phone is not accepting calls. Unable to LVM. If patient calls back, please relay clinician's message below.    Three unsuccessful attempts. Letter sent.     Marcos KAN RN  [facility]

## 2024-10-23 LAB
ENA SCL70 IGG SER IA-ACNC: <0.6 U/ML
ENA SCL70 IGG SER IA-ACNC: NEGATIVE

## 2024-10-31 ENCOUNTER — TELEPHONE (OUTPATIENT)
Dept: FAMILY MEDICINE | Facility: CLINIC | Age: 54
End: 2024-10-31
Payer: MEDICAID

## 2024-10-31 NOTE — TELEPHONE ENCOUNTER
Patient Quality Outreach    Patient is due for the following:   Breast Cancer Screening - Mammogram    Next Steps:   Schedule a Adult Preventative    Type of outreach:    No outreach done.  Scheduling left the patient a message on 10/18/24.  Will follow up in 90 days if not schedule.      Questions for provider review:    None           Andrea Behrend

## 2024-11-08 ENCOUNTER — TELEPHONE (OUTPATIENT)
Dept: FAMILY MEDICINE | Facility: CLINIC | Age: 54
End: 2024-11-08
Payer: COMMERCIAL

## 2024-11-08 NOTE — TELEPHONE ENCOUNTER
Forms/Letter Request    Type of form/letter: OTHER: Professional statement of need for housing        Do we have the form/letter: Yes: Needs PCP to fill out forms for pt housing for animal support. She have a virtual appt scheduled for 12/4/24 if PCP needs to see patient regarding forms.    Who is the form from? Patient    Where did/will the form come from? Patient or family brought in       When is form/letter needed by: asap    How would you like the form/letter returned:     Patient Notified form requests are processed in 5-7 business days:Yes    Okay to leave a detailed message?: Yes at mobile number on file [phone] (mobile)

## 2024-11-08 NOTE — TELEPHONE ENCOUNTER
Form completed as best I can but didn't sign yet  When pt comes in to  please put her on my schedule so that we can finish form together and I can give it to her

## 2024-11-12 NOTE — TELEPHONE ENCOUNTER
I still have form at my desk and will keep until her visit.  If she needs it sooner and shows up please put her on my schedule to review at that time

## 2024-11-12 NOTE — TELEPHONE ENCOUNTER
Pt has a virtual appt with you on 12/04. Do you still have the form on your desk because I do not have it?

## 2024-11-13 NOTE — TELEPHONE ENCOUNTER
Okay, I did try to call and inform pt that the form will be completed during the virtual visit with you. The name of the VM is not pt name, not sure if it's even pt's phone number. I did try the other numbers and they did not go through. We will have to wait for pt to call back or  the form.

## 2024-11-14 NOTE — PROGRESS NOTES
11/14/2024  Clinic Care Coordination Contact  Care Team Conversations    Chart Reviewed patient did not attend established care with Dr Kessler 11-14-24 and rescheduled to 12-12-24   And scheduled with Dr Correa on 12-4-24 for establish care.  [facility] team will monitor if patient attends establish care with Dr Correa on 12-4-24    Isael Ledesma  Community Health Worker  [facility] Care Coordination  [email or web address]   Office: [phone]  Fax: [phone]

## 2024-11-27 ENCOUNTER — ANCILLARY PROCEDURE (OUTPATIENT)
Dept: GENERAL RADIOLOGY | Facility: CLINIC | Age: 54
End: 2024-11-27
Attending: FAMILY MEDICINE
Payer: COMMERCIAL

## 2024-11-27 ENCOUNTER — TELEPHONE (OUTPATIENT)
Dept: FAMILY MEDICINE | Facility: CLINIC | Age: 54
End: 2024-11-27

## 2024-11-27 ENCOUNTER — OFFICE VISIT (OUTPATIENT)
Dept: FAMILY MEDICINE | Facility: CLINIC | Age: 54
End: 2024-11-27
Payer: COMMERCIAL

## 2024-11-27 VITALS
TEMPERATURE: 98.2 F | BODY MASS INDEX: 34.16 KG/M2 | SYSTOLIC BLOOD PRESSURE: 133 MMHG | WEIGHT: 174 LBS | HEART RATE: 74 BPM | DIASTOLIC BLOOD PRESSURE: 87 MMHG | RESPIRATION RATE: 20 BRPM | HEIGHT: 60 IN | OXYGEN SATURATION: 97 %

## 2024-11-27 DIAGNOSIS — F09 COGNITIVE DISORDER: ICD-10-CM

## 2024-11-27 DIAGNOSIS — Z28.21 NO VACCINATION-PT REFUSE: ICD-10-CM

## 2024-11-27 DIAGNOSIS — E11.65 TYPE 2 DIABETES MELLITUS WITH HYPERGLYCEMIA, WITHOUT LONG-TERM CURRENT USE OF INSULIN (H): Primary | ICD-10-CM

## 2024-11-27 DIAGNOSIS — M79.10 MYALGIA: ICD-10-CM

## 2024-11-27 DIAGNOSIS — R06.2 WHEEZING: ICD-10-CM

## 2024-11-27 PROCEDURE — 82043 UR ALBUMIN QUANTITATIVE: CPT | Performed by: FAMILY MEDICINE

## 2024-11-27 PROCEDURE — 72100 X-RAY EXAM L-S SPINE 2/3 VWS: CPT | Mod: TC | Performed by: STUDENT IN AN ORGANIZED HEALTH CARE EDUCATION/TRAINING PROGRAM

## 2024-11-27 PROCEDURE — 82570 ASSAY OF URINE CREATININE: CPT | Performed by: FAMILY MEDICINE

## 2024-11-27 PROCEDURE — 71046 X-RAY EXAM CHEST 2 VIEWS: CPT | Mod: TC | Performed by: RADIOLOGY

## 2024-11-27 RX ORDER — METFORMIN HYDROCHLORIDE 750 MG/1
750 TABLET, EXTENDED RELEASE ORAL
COMMUNITY
Start: 2024-10-21 | End: 2024-11-27 | Stop reason: SINTOL

## 2024-11-27 RX ORDER — LOSARTAN POTASSIUM 50 MG/1
1 TABLET ORAL DAILY
COMMUNITY
Start: 2024-10-23 | End: 2024-11-27

## 2024-11-27 ASSESSMENT — ANXIETY QUESTIONNAIRES
3. WORRYING TOO MUCH ABOUT DIFFERENT THINGS: NEARLY EVERY DAY
1. FEELING NERVOUS, ANXIOUS, OR ON EDGE: NEARLY EVERY DAY
GAD7 TOTAL SCORE: 14
2. NOT BEING ABLE TO STOP OR CONTROL WORRYING: NEARLY EVERY DAY
4. TROUBLE RELAXING: SEVERAL DAYS
IF YOU CHECKED OFF ANY PROBLEMS ON THIS QUESTIONNAIRE, HOW DIFFICULT HAVE THESE PROBLEMS MADE IT FOR YOU TO DO YOUR WORK, TAKE CARE OF THINGS AT HOME, OR GET ALONG WITH OTHER PEOPLE: EXTREMELY DIFFICULT
7. FEELING AFRAID AS IF SOMETHING AWFUL MIGHT HAPPEN: SEVERAL DAYS
5. BEING SO RESTLESS THAT IT IS HARD TO SIT STILL: SEVERAL DAYS
GAD7 TOTAL SCORE: 14
GAD7 TOTAL SCORE: 14
6. BECOMING EASILY ANNOYED OR IRRITABLE: MORE THAN HALF THE DAYS
7. FEELING AFRAID AS IF SOMETHING AWFUL MIGHT HAPPEN: SEVERAL DAYS
8. IF YOU CHECKED OFF ANY PROBLEMS, HOW DIFFICULT HAVE THESE MADE IT FOR YOU TO DO YOUR WORK, TAKE CARE OF THINGS AT HOME, OR GET ALONG WITH OTHER PEOPLE?: EXTREMELY DIFFICULT

## 2024-11-27 NOTE — TELEPHONE ENCOUNTER
"\"Help schedule diabetes education and other referrals placed last month   Brain MRI   Mammogram   MTM \"  ASAP  "

## 2024-11-27 NOTE — PROGRESS NOTES
Assessment & Plan     Type 2 diabetes mellitus with hyperglycemia, without long-term current use of insulin (H)  ***  - Albumin Random Urine Quantitative with Creat Ratio  - empagliflozin (JARDIANCE) 10 MG TABS tablet  Dispense: 90 tablet; Refill: 1  - Primary Care - Care Coordination Referral  - vitamin B-Complex  Dispense: 100 tablet; Refill: 4    Cognitive disorder  ***  - Primary Care - Care Coordination Referral    No vaccination-pt refuse  ***          Subjective   Amen is a 54 year old, presenting for the following health issues:  Follow Up and Update allegies (Dust mold and Fall season)        11/27/2024     2:17 PM   Additional Questions   Roomed by Soni WEEMS   Accompanied by Friend, Anais     History of Present Illness       Back Pain:  She presents for follow up of back pain. Patient's back pain is a chronic problem.  Location of back pain:  Right lower back, left lower back, right middle of back, right hip and other  Description of back pain: burning, cramping, fullness, gnawing, sharp, shooting, stabbing and other  Back pain spreads: right buttocks, left foot, right side of neck and left side of neck    Since patient first noticed back pain, pain is: always present, but gets better and worse  Does back pain interfere with her job:  Yes       Mental Health Follow-up:  Patient presents to follow-up on Depression & Anxiety.Patient's depression since last visit has been:  Bad  The patient is having other symptoms associated with depression.  Patient's anxiety since last visit has been:  Bad  The patient is having other symptoms associated with anxiety.  Any significant life events: relationship concerns, job concerns, financial concerns, housing concerns, grief or loss, health concerns and other  Patient is feeling anxious or having panic attacks.  Patient has no concerns about alcohol or drug use.    Diabetes:   She presents for follow up of diabetes.  She is checking home blood glucose four or more times  "daily.   She checks blood glucose before and after meals and at bedtime.  Blood glucose is never over 200 and never under 70. She is aware of hypoglycemia symptoms including shakiness, dizziness, weakness, lethargy, blurred vision, confusion and other.   She is concerned about other.   She is having numbness in feet, burning in feet, excessive thirst and blurry vision.  The patient has not had a diabetic eye exam in the last 12 months.          Hypertension: She presents for follow up of hypertension.  She does check blood pressure  regularly outside of the clinic. Outside blood pressures have been over 140/90. She follows a low salt diet.     Hypothyroidism:     Since last visit, patient describes the following symptoms::  Anxiety, Depression, Dry skin, Fatigue, Loose stools, Tremors and Weight loss    Weight loss::  Less than 5 lbs.    Headaches:   Since the patient's last clinic visit, headaches are: worsened  The patient is getting headaches:  HAVING TO FIGURE OUTHINGS PAPR WRK APARTMENT MANAGEMENT HARRASING ME AND MY ESAz Finding a new place to [place] and b in peace in with my ESAz Worring jeremiah the outcome loki lopez trinh Managmentakes me to court Aftr Jan 2025  She is not able to do normal daily activities when she has a migraine.  The patient is taking the following rescue/relief medications:  Ibuprofen (Advil, Motrin)   Patient states \"The relief is inconsistent\" from the rescue/relief medications.   The patient is taking the following medications to prevent migraines:  No medications to prevent migraines  In the past 4 weeks, the patient has gone to an Urgent Care or Emergency Room 0 times times due to headaches.    Vascular Disease:  She presents for follow up of vascular disease.     She never takes nitroglycerin. She is not taking daily aspirin.    Reason for visit:  Folow up and referals to specialistsphysical therapy    She eats 4 or more servings of fruits and vegetables daily.She consumes 2 sweetened " "beverage(s) daily.She exercises with enough effort to increase her heart rate 10 to 19 minutes per day.  She exercises with enough effort to increase her heart rate 3 or less days per week.   She is taking medications regularly.     Went to [facility] due to high blood pressure    Received a letter for someone who shares apartment with them - he is missing and acting confused.  He is receiving letters that he is going to be evicted but she isn't sure if she is going to be evicted.      Tried metformin and caused severe nausea and burping   Did get glucose meter  Fasting Premeal Post meal bedtime  Can't remember and didn't bring her meter in     Did not start losartan     Taking high dose vitamin D    Did loose 5lb with weight loss.              Objective    /87 (BP Location: Right arm, Patient Position: Sitting, Cuff Size: Adult Large)   Pulse 74   Temp 98.2  F (36.8  C) (Oral)   Resp 20   Ht 1.518 m (4' 11.76\")   Wt 78.9 kg (174 lb)   LMP 10/13/2024   SpO2 97%   BMI 34.25 kg/m    Body mass index is 34.25 kg/m .  Physical Exam   Complete 10 point ROS completed today as part of the exam and patient denies any symptoms as reviewed in HPI     Wt Readings from Last 3 Encounters:   11/27/24 78.9 kg (174 lb)   10/14/24 81.6 kg (180 lb)       Patient's last menstrual period was 10/13/2024.    All normal as below except abnormalities include: ***  General is a  54 year old sitting comfortably in no apparent distress   HEENT:  TM are clear bilaterally.  Eye exams within normal   Neck: Supple without lymphadenopathy or thyromegally  CV: Regular rate and rhythm S1S2 without rubs, murmurs or gallops,   Lungs: Clear to auscultation bilaterally  Abd:  +BS, soft NT/ND,  No masses or organomegally,   Extremities: Warm, No Edema, 2+ Pedal and radial pulses bilaterally  Skin: No lesions or rashes noted  Neuro: Able to ambulate around the exam room with equal movement, strength and normal coordination of the upper and lower " extremeties symmetrically  Pelvic:*** Normal external genitalia.  Healthy normal vaginal mucosa.  Health appearing cervix.  Testing obtained without pain or difficulty.      Breast: Normal breast exam.  No nipple changes, breast appear symmetric without nodules/lumps or skin changes. No lymphadenopathy noted.      Independent historian: ***    History summarized from1-2:***  Old Records-1: Outside allergies, meds, problems and immunizations were reconciled as needed from CareEverywhere  ***  Radiology tests reviewed-1: ***  Lab tests reviewed-1: ***  Medicine tests reviewed-1: ***        Carolynn Correa MD             Signed Electronically by: Carolynn Correa MD  {Email feedback regarding this note to [email or web address]   :[id number]}

## 2024-11-28 LAB
CREAT UR-MCNC: 220 MG/DL
MICROALBUMIN UR-MCNC: 57.2 MG/L
MICROALBUMIN/CREAT UR: 26 MG/G CR (ref 0–25)

## 2024-12-02 ENCOUNTER — PATIENT OUTREACH (OUTPATIENT)
Dept: CARE COORDINATION | Facility: CLINIC | Age: 54
End: 2024-12-02
Payer: COMMERCIAL

## 2024-12-02 NOTE — PROGRESS NOTES
Clinic Care Coordination Contact:    Today's date: 12/2/2024    Reason(s):   -CCC CHW Initial Outreach Called- Referral to Clinic Care Coordination (CCC) Service  -Per patient request    Coordinate Care:   CHW consulted with [facility] Alyx LLOYD via phone (via Teams communication) today, 12/02/2024 regarding to the pt request from attempted #1 below. PREMA advised unavailable at 7:30AM. PREMA okayed with next available around 9:00AM.      Potential Patient Outreach Discussion:   Attempted #2: CHW call the patient back. Spoke with patient. Relayed SW advised above with patient. Pt agreed to initial assessment appt on 12/13/2024 at 9:00AM with [facility]  via phone visit. Pt initial assessment appt schedule. Pt rushing up the conversation and shared that she's expecting a incoming call. Pt end the call.    Unable to shared writer/CHW contact info given to the patient and completing the CHW initial screening and Care Mgmt (GEN) screening (below) with the patient at this time.    Coordinate Care Continues:   Writer update patient initial assessment appt with [facility] PREMA via Teams communication today including unable to complete the CHW initial screening and Care Mgnt (GEN) screening with patient.    Plan:   -Patient attend initial assessment appt on 12/13/2024 at 9:00AM with [facility]  via phone visit.

## 2024-12-02 NOTE — PROGRESS NOTES
Clinic Care Coordination Contact:  Community Health Worker Initial Outreach    Today's date: 12/2/2024    Reason: CCC CHW Initial Outreach Called- Referral to Clinic Care Coordination (CCC) Service  Referral provider/name: Carolynn Correa MD CHW Initial Information Gathering:  Referral Source: PCP  Preferred Hospital:  (Not able to assessed due to the patient time.)  Preferred Urgent Care:  (Not able to assessed due to the patient time.)  Current living arrangement:: Not Assessed (Not able to assessed due to the patient time.)  Type of residence::  (Not able to assessed due to the patient time.)  Informal Support system:: Other (Not able to assessed due to the patient time.)  Transportation means:: Other (Patient shared; have no car. Sometime, [employer] member(s) helps bringing me to attend [employer]. Not going to [employer] every Sunday.)     Care Mgmt (GEN) screening:   -Employment status? Not able to assessed due to the patient time.   -Mobility Status? Not able to assessed due to the patient time.   -Fallen 2 or more times in the past year? Not able to assessed due to the patient time.   -Any fall with injury in the past year? Not able to assessed due to the patient time.      Potential Patient Outreach Discussion:   Attempted #1: Patient was identified as a potential candidate and referred to Clinic Care Coordination Service. CHW call and spoke with patient today, 12/02/2024 to discuss possible clinic care coordination enrollment. Have introduced myself to patient. Clinic care coordination service was described to the patient and immediate needs were discussed. Patient is aware CCC team includes CHW, SW, RN, and FRW. The patient agreed enrollment into CCC service. CHW explained initial assessment, CCC monthly outreach follow up and CCC outreach standard work.     Writer verified financial concerns (food stamp and or other benefits). Pt confirmed main concerns below. Pt declined to shared financial concerns detail.      Patient request initial assessment appt schedule 7:30AM with [facility] .   CHW reviewed Alyx PRYOR schedule and no 7:30AM that is 60 minutes appt slot available at this time. CHW explained initial assessment appt and is required a 60 minutes appt slot. Pt request writer to ask a supervisor about this and get back with her. Pt is aware writer will relayed the pt request with CCC SW and call the pt back. Pt agreed.      Patient shared:   -Have housing issue and financial issue.   -Main concerns: Have housing issue and need a  to fight for my rights for my lease. Need help finding a place and help with moving. Don't have the finance to hire someone to help with moving.   -Attend two churches and have no car. Unable to attend [employer] every Sunday and considering not a member. Volunteer is not be available because I'm not a member; considering a visitor.    -Have appt with Dr. Correa next week for paperwork. (FYI: pt shared [employer] mobility application).     Plan:   Writer relay patient request above regarding to scheduling pt initial assessment appt with CCC SW per pt request. CHW call the patient back per pt agreed.

## 2024-12-06 PROBLEM — M54.42 CHRONIC BILATERAL LOW BACK PAIN WITH BILATERAL SCIATICA: Status: ACTIVE | Noted: 2024-12-06

## 2024-12-06 PROBLEM — G89.29 CHRONIC BILATERAL LOW BACK PAIN WITH BILATERAL SCIATICA: Status: ACTIVE | Noted: 2024-12-06

## 2024-12-06 PROBLEM — M54.41 CHRONIC BILATERAL LOW BACK PAIN WITH BILATERAL SCIATICA: Status: ACTIVE | Noted: 2024-12-06

## 2024-12-06 PROBLEM — Z87.898 HISTORY OF SEIZURES: Status: ACTIVE | Noted: 2024-12-06

## 2024-12-06 PROBLEM — Z87.09 HISTORY OF ASTHMA: Status: ACTIVE | Noted: 2024-12-06

## 2024-12-06 PROBLEM — Z87.828 H/O HEAD INJURY: Status: ACTIVE | Noted: 2024-12-06

## 2024-12-10 NOTE — TELEPHONE ENCOUNTER
When she comes in for her appt., please have the rooming staff  message the specialty  to come to the room and help pt. Thanks!

## 2024-12-16 ENCOUNTER — PATIENT OUTREACH (OUTPATIENT)
Dept: NURSING | Facility: CLINIC | Age: 54
End: 2024-12-16
Payer: COMMERCIAL

## 2024-12-16 NOTE — PROGRESS NOTES
Clinic Care Coordination Contact  Clinic Care Coordination Contact  OUTREACH    Referral Information:            Chief Complaint   Patient presents with    Clinic Care Coordination - Follow-up        Universal Utilization: appropriate      Utilization      No Show Count (past year)  0             ED Visits  0             Hospital Admissions  0                    Current as of: 12/16/2024 12:13 PM                Clinical Concerns:  Current Medical Concerns:  none    Current Behavioral Concerns: none    Education Provided to patient: CCC education, support and resources      Health Maintenance Reviewed:    Clinical Pathway: None    Medication Management:  Medication review status: Medications reviewed and no changes reported per patient.             Functional Status:      Living Situation:       Lifestyle & Psychosocial Needs:    Social Drivers of Health     Food Insecurity: High Risk (10/14/2024)    Food Insecurity     Within the past 12 months, did you worry that your food would run out before you got money to buy more?: Yes     Within the past 12 months, did the food you bought just not last and you didn t have money to get more?: Yes   Depression: At risk (10/14/2024)    PHQ-2     PHQ-2 Score: 4   Housing Stability: High Risk (10/14/2024)    Housing Stability     Do you have housing? : No     Are you worried about losing your housing?: Yes   Tobacco Use: Low Risk  (12/4/2024)    Patient History     Smoking Tobacco Use: Never     Smokeless Tobacco Use: Never     Passive Exposure: Never   Financial Resource Strain: High Risk (10/14/2024)    Financial Resource Strain     Within the past 12 months, have you or your family members you live with been unable to get utilities (heat, electricity) when it was really needed?: Yes   Alcohol Use: Not on file   Transportation Needs: High Risk (10/14/2024)    Transportation Needs     Within the past 12 months, has lack of transportation kept you from medical appointments,  getting your medicines, non-medical meetings or appointments, work, or from getting things that you need?: Yes   Physical Activity: Not on file   Interpersonal Safety: Not on file   Stress: Not on file   Social Connections: Unknown (9/23/2024)    [facility]    Social Connections     Frequency of Communication with Friends and Family: Not on file   Health Literacy: Not on file                            Resources and Interventions:  Current Resources: none        Care Plan:  Care Plan: ARHMS worker       Problem: ARHMS worker       Priority: High Onset Date: 12/16/2024      Goal: ARHMS worker       Start Date: 12/16/2024    This Visit's Progress: 20%    Priority: High    Note:     Barriers: navigating health care system  Strengths: knowledgeable and independent  Patient expressed understanding of goal: yes  Action steps to achieve this goal:  1. I will answer the phone when [facility] calls  2. I will be available to meet with ARHMS worker and therapist  3. I will follow up with CCC for further support if needed.                                Care Plan: CADI waiver       Problem: HP GENERAL PROBLEM       Goal: CADI waiver       Start Date: 12/16/2024    This Visit's Progress: 10%    Priority: High    Note:     Barriers: navigating [facility] resources  Strengths: motivated to seek support  Patient expressed understanding of goal: yes  Action steps to achieve this goal:  1. I will answer the phone when [facility] calls me  2. I will be available for in person assessment  3. I will follow up with CCC at next outreach                                Patient/Caregiver understanding: yes       Future Appointments                In 2 days Carolynn Correa MD [facility]    In 1 month Carolynn Correa MD [facility]            Plan: CCSW contacted pt and stated that I had 10 minutes to chat. A time frame must be  placed on pt, to help stay on track. Still did not complete initial screening. [facility]. CCSW placed referral today to [facility]. Pt was insistent on CADI waiver, CCSW placed [facility] assessment online today for CADI waiver. Advised pt that CCC will follow up with her, and if she has any questions prior to next outreach to please contact CCSW. Pt understood.       Alyx Cline MSW, [facility]  Social Work Care Coordinator

## 2024-12-18 ENCOUNTER — OFFICE VISIT (OUTPATIENT)
Dept: FAMILY MEDICINE | Facility: CLINIC | Age: 54
End: 2024-12-18
Payer: COMMERCIAL

## 2024-12-18 VITALS
HEIGHT: 60 IN | DIASTOLIC BLOOD PRESSURE: 75 MMHG | WEIGHT: 179 LBS | TEMPERATURE: 97.9 F | BODY MASS INDEX: 35.14 KG/M2 | OXYGEN SATURATION: 97 % | RESPIRATION RATE: 18 BRPM | HEART RATE: 76 BPM | SYSTOLIC BLOOD PRESSURE: 120 MMHG

## 2024-12-18 DIAGNOSIS — E11.65 TYPE 2 DIABETES MELLITUS WITH HYPERGLYCEMIA, WITHOUT LONG-TERM CURRENT USE OF INSULIN (H): Primary | ICD-10-CM

## 2024-12-18 PROCEDURE — 99213 OFFICE O/P EST LOW 20 MIN: CPT | Performed by: FAMILY MEDICINE

## 2024-12-18 NOTE — PROGRESS NOTES
Assessment & Plan     Type 2 diabetes mellitus with hyperglycemia, without long-term current use of insulin (H)  UnControlled.  Addressed smoking status and aspirin therapy.  Recommended annual eye exam and dental cares. Reviewed foot cares and foot exam.  Blood pressure and lipid management reviewed today.  Vaccines reviewed and updated.  Plan for glucose management includes ongoing focus on healthy diabetic diet and increased activity, not tolerating metformin, trial of jardiance and titrate up as tolerated. Consider januvia next as needed.  Labs ordered as below including:     Hemoglobin A1C   Date Value Ref Range Status   10/14/2024 9.3 (H) 0.0 - 5.6 % Final     Comment:     Normal <5.7%   Prediabetes 5.7-6.4%    Diabetes 6.5% or higher     Note: Adopted from ADA consensus guidelines.    ,   Lab Results   Component Value Date     (H) 10/14/2024   ,   Creatinine   Date Value Ref Range Status   10/14/2024 0.44 (L) 0.51 - 0.95 mg/dL Final        Specialty schedule met with pt today to help schedule her:  Neurology  Brain MRI    Still needs to schedule:  ENT, Eye, ST, OT, PT, Mental health, MTM,         Subjective   Amen is a 54 year old, presenting for the following health issues:  Follow Up, Mental Health Problem, and Letter Request        12/18/2024    12:57 PM   Additional Questions   Roomed by hser   Accompanied by self     History of Present Illness       Reason for visit:  Discuse letter for court on my health condition Disability of my urgency need to b have a roof ovr mt head a place to b safe with my ESAz az i am being Threatened Attacked to leave by the End of Jan UNDER FALSE PRETENSES   She is taking medications regularly.     Having a hard day today- studdering, bumping into things, in more pain, gagging more.  Knees are painful.      Diabetes- more brown rice, more oils and less carbs.    Did loose weight but gained back again.  She is a  and mom is a  and working on LSM    "    Tired of talking and hoping for TTY     Needs help setting up [employer]     Apartment management is working on getting her roommate evicted or pay his fees, etc.  Working     Declines shots today     Did have phone call with SW 12/16/24   Plan: CCSW contacted pt and stated that I had 10 minutes to chat. A time frame must be placed on pt, to help stay on track. Still did not complete initial screening. Pt is agreeable to [facility] services and therapy. CCSW placed referral today to [facility]. Pt was insistent on CADI waiver, CCSW placed mnchoices assessment online today for CADI waiver. Advised pt that CCC will follow up with her, and if she has any questions prior to next outreach to please contact CCSW. Pt understood.         NYLA Bowers, [facility]  Social Work Care Coordinator            Objective    /75 (BP Location: Right arm, Patient Position: Sitting, Cuff Size: Adult Large)   Pulse 76   Temp 97.9  F (36.6  C) (Temporal)   Resp 18   Ht 1.511 m (4' 11.5\")   Wt 81.2 kg (179 lb)   LMP 12/09/2024 (Approximate)   SpO2 97%   BMI 35.55 kg/m    Body mass index is 35.55 kg/m .  Physical Exam   Complete 10 point ROS completed today as part of the exam and patient denies any symptoms as reviewed in HPI     Wt Readings from Last 3 Encounters:   12/18/24 81.2 kg (179 lb)   11/27/24 78.9 kg (174 lb)   10/14/24 81.6 kg (180 lb)       Patient's last menstrual period was 12/09/2024 (approximate).    All normal as below except abnormalities include: pt is talkative today and has difficulty staying focused.  Hard to tell what she needs or wants to address today.  Very focused on her upcoming eviction and making sure I know how much she is being unfairly treated.  Pressured speech at times.  Difficult to direct.    General is a  54 year old sitting comfortably in no apparent distress   HEENT:  TM are clear bilaterally.  Eye exams within normal   Neck: Supple without lymphadenopathy or thyromegally  CV: Regular rate " and rhythm S1S2 without rubs, murmurs or gallops,   Lungs: Clear to auscultation bilaterally  Abd:  +BS, soft NT/ND,  No masses or organomegally,   Extremities: Warm, No Edema, 2+ Pedal and radial pulses bilaterally  Skin: No lesions or rashes noted  Neuro: Able to ambulate around the exam room with equal movement, strength and normal coordination of the upper and lower extremeties symmetrically        Carolynn Correa MD             Signed Electronically by: Carolynn Correa MD      Prior to immunization administration, verified patients identity using patient s name and date of birth. Please see Immunization Activity for additional information.     Screening Questionnaire for Adult Immunization    Are you sick today?   No   Do you have allergies to medications, food, a vaccine component or latex?   No   Have you ever had a serious reaction after receiving a vaccination?   No   Do you have a long-term health problem with heart, lung, kidney, or metabolic disease (e.g., diabetes), asthma, a blood disorder, no spleen, complement component deficiency, a cochlear implant, or a spinal fluid leak?  Are you on long-term aspirin therapy?   No   Do you have cancer, leukemia, HIV/AIDS, or any other immune system problem?   No   Do you have a parent, brother, or sister with an immune system problem?   No   In the past 3 months, have you taken medications that affect  your immune system, such as prednisone, other steroids, or anticancer drugs; drugs for the treatment of rheumatoid arthritis, Crohn s disease, or psoriasis; or have you had radiation treatments?   No   Have you had a seizure, or a brain or other nervous system problem?   No   During the past year, have you received a transfusion of blood or blood    products, or been given immune (gamma) globulin or antiviral drug?   No   For women: Are you pregnant or is there a chance you could become       pregnant during the next month?   No   Have you received any vaccinations in  the past 4 weeks?   No     Immunization questionnaire answers were all negative.      Patient instructed to remain in clinic for 15 minutes afterwards, and to report any adverse reactions.     Screening performed by Malron Baumann MA on 12/18/2024 at 1:04 PM.

## 2024-12-19 NOTE — TELEPHONE ENCOUNTER
Routing refill request to provider for review/approval because:  Drug not active on patient's medication list  Patient was seen on 12/18/24 by Dr Correa    Message routed to Dr Correa for review / refill    Sarita Sanchez RN  [facility]      Pt is requesting new rxs for    Magnesium 30mg tabs    Potassium chloride crs er 10 tbcr    Did not see on active med list please verify and send new rx. Thank you!     [place] [facility] pharmacy  [phone]

## 2024-12-19 NOTE — TELEPHONE ENCOUNTER
Pt is requesting new rxs for    Magnesium 30mg tabs    Potassium chloride crs er 10 tbcr    Did not see on active med list please verify and send new rx. Thank you!     [facility]  [phone]

## 2024-12-19 NOTE — TELEPHONE ENCOUNTER
Pt has never shared with me that she is on these meds.      Her potassium was normal when last checked- please ask pt why she is on potassium?  Who has prescribed this for her in the past.     Why is she on magnesium?

## 2024-12-31 NOTE — TELEPHONE ENCOUNTER
Attempt # 1  Called Phone # [phone]      Was Call answered? No     Non-detailed voicemail left on December 31, 2024 2:29 PM to call clinic at: [phone].     On Call Back:     Please relay per Dr. Correa, why is patient taking magnesium and potassium? Patient's most recent potassium was normal.      Thank you,  Vern, Triage RN [facility] [place]    2:29 PM 12/31/2024

## 2025-01-02 ENCOUNTER — TELEPHONE (OUTPATIENT)
Dept: FAMILY MEDICINE | Facility: CLINIC | Age: 55
End: 2025-01-02

## 2025-01-02 ENCOUNTER — VIRTUAL VISIT (OUTPATIENT)
Dept: EDUCATION SERVICES | Facility: CLINIC | Age: 55
End: 2025-01-02
Attending: FAMILY MEDICINE

## 2025-01-02 DIAGNOSIS — E11.65 TYPE 2 DIABETES MELLITUS WITH HYPERGLYCEMIA, WITHOUT LONG-TERM CURRENT USE OF INSULIN (H): ICD-10-CM

## 2025-01-02 NOTE — PROGRESS NOTES
Virtual Visit Details    Type of service:  Telephone Visit   Phone call duration: 68 minutes   Originating Location (pt. Location): Home    Distant Location (provider location):  Off-site  Telephone Visit preferred    Diabetes Self-Management Education & Support  Brayan Baires presents today for education related to Type 2 diabetes    Patient is being treated with:  Oral Agent, Diet  She is accompanied by self    Year of diagnosis: 10/2025  Referring provider:  Dr. Correa  Living Situation: Roommate, Apartment  Employment: Disability    PATIENT CONCERNS/REASON FOR REFERRAL   Unable to answer-refers to brain issues and other diagnoses not on problem list    ASSESSMENT:    Taking Medication:     Current Diabetes Management per Patient:  Taking diabetes medications? yes:     Diabetes Medication(s)       Sodium-Glucose Co-Transporter 2 (SGLT2) Inhibitors       empagliflozin (JARDIANCE) 10 MG TABS tablet Take 1 tablet (10 mg) by mouth daily.            Monitoring  Patient glucose self monitoring as follows: 2 times daily  BG results: 106, 270, 153, 132, 167    Patient's most recent   Lab Results   Component Value Date    A1C 9.3 10/14/2024      Patient's A1C goal: <7.0    Activity: not assessed    Healthy Eating:  Puree or soft foods due to jaw pain-one main meal every few days, snacks  Raw food diet-before COVID  Sprouted bread, no rice  Main meal: vegetarian broth with deep greens, dried pea pods, edamame, crackers    Problem Solving:    Patient is at risk of hypoglycemia?: NO  Hospitalizations for hyper or hypoglycemia: No    Healthy Coping and Stress Management:   Sources of stress identified by patient:  Other (please specify):  Potential eviction end of January  Coping mechanisms identified by patient:  Watching television  Playing/listening to music      EDUCATION and INSTRUCTION PROVIDED AT THIS VISIT:    T2DM seen for comprehensive review at the request of Dr. Correa. A1C=9.3. Complex PMH. Assessment was very  "minimal/unproductive; answers were always off topic. Patient often referred to: confusion \"because of my brain,\" COVID, 1970's, disorder that needs sugar but gets sick, childhood drama, wanting to be in [employer], PTSD, and culture. Reports checking glucose twice daily with numbers as low as 106-270, states numbers are better when managed with vitamin B vitamins and Chromium BG regulator (states letter was written for approval). States Jardiance causes extreme fatigue and caused her to pass out, only taking 1/2 tab. She declines taking full tab. Declined GLP-1, doesn't want to do injections and with history of nausea I am hesitant to start. Did not tolerate Metformin due to burping. Could consider DPP-4 however she would need to know if contains animal products. Due to length of visit, I will defer to PCP who she is seeing this afternoon. Plan as noted below.     Patient-stated goal written and given to Brayna Viry.  Verbalized and demonstrated understanding of instructions.   See patient instructions  AVS printed and given to patient    PLAN:  *Continue Jardiance  *Check glucose in the morning fasting  *Fasting blood sugar goal     FOLLOW-UP:    *PCP today    Time spent with patient at today's visit was 68 minutes.      Daysi Be RN, [facility]  Diabetes Education Department  [facility] [place] [facility] [place]    Any diabetes medication initiation or dose changes were made via the [facility] Standing Orders per the patient's referring provider. A copy of this encounter was shared with the provider-Madeline.    "

## 2025-01-02 NOTE — TELEPHONE ENCOUNTER
General Call    Contacts       Contact Date/Time Type Contact Phone/Fax    01/02/2025 03:02 PM CST Phone (Incoming) Brayan Baires (Self) [phone] (M)          Reason for Call:  Cost of medication    What are your questions or concerns:  Patient is having difficulty paying for her medication. Specifically her vitamin B-Complex. Wondering if there are any resources that can be given to her to help pay for said medication    Date of last appointment with provider: 12/18/2024    Okay to leave a detailed message?: Yes at Home number on file [phone] (home)

## 2025-01-02 NOTE — LETTER
1/2/2025      Brayan Baires  [address]   [address]  [place] [address]      Dear Colleague,    Thank you for referring your patient, Brayan Baires, to the [facility]. Please see a copy of my visit note below.    Virtual Visit Details    Type of service:  Telephone Visit   Phone call duration: 68 minutes   Originating Location (pt. Location): Home    Distant Location (provider location):  Off-site  Telephone Visit preferred    Diabetes Self-Management Education & Support  Brayan Baires presents today for education related to Type 2 diabetes    Patient is being treated with:  Oral Agent, Diet  She is accompanied by self    Year of diagnosis: 10/2025  Referring provider:  Dr. Correa  Living Situation: Roommate, Apartment  Employment: Disability    PATIENT CONCERNS/REASON FOR REFERRAL   Unable to answer-refers to brain issues and other diagnoses not on problem list    ASSESSMENT:    Taking Medication:     Current Diabetes Management per Patient:  Taking diabetes medications? yes:     Diabetes Medication(s)       Sodium-Glucose Co-Transporter 2 (SGLT2) Inhibitors       empagliflozin (JARDIANCE) 10 MG TABS tablet Take 1 tablet (10 mg) by mouth daily.            Monitoring  Patient glucose self monitoring as follows: 2 times daily  BG results: 106, 270, 153, 132, 167    Patient's most recent   Lab Results   Component Value Date    A1C 9.3 10/14/2024      Patient's A1C goal: <7.0    Activity: not assessed    Healthy Eating:  Puree or soft foods due to jaw pain-one main meal every few days, snacks  Raw food diet-before COVID  Sprouted bread, no rice  Main meal: vegetarian broth with deep greens, dried pea pods, edamame, crackers    Problem Solving:    Patient is at risk of hypoglycemia?: NO  Hospitalizations for hyper or hypoglycemia: No    Healthy Coping and Stress Management:   Sources of stress identified by patient:  Other (please specify):  Potential eviction end of January  Coping mechanisms  "identified by patient:  Watching television  Playing/listening to music      EDUCATION and INSTRUCTION PROVIDED AT THIS VISIT:    T2DM seen for comprehensive review at the request of Dr. Correa. A1C=9.3. Complex PMH. Assessment was very minimal/unproductive; answers were always off topic. Patient often referred to: confusion \"because of my brain,\" COVID, 1970's, disorder that needs sugar but gets sick, childhood drama, wanting to be in [employer], PTSD, and culture. Reports checking glucose twice daily with numbers as low as 106-270, states numbers are better when managed with vitamin B vitamins and Chromium BG regulator (states letter was written for approval). States Jardiance causes extreme fatigue and caused her to pass out, only taking 1/2 tab. She declines taking full tab. Declined GLP-1, doesn't want to do injections and with history of nausea I am hesitant to start. Did not tolerate Metformin due to burping. Could consider DPP-4 however she would need to know if contains animal products. Due to length of visit, I will defer to PCP who she is seeing this afternoon. Plan as noted below.     Patient-stated goal written and given to Brayan Baires.  Verbalized and demonstrated understanding of instructions.   See patient instructions  AVS printed and given to patient    PLAN:  *Continue Jardiance  *Check glucose in the morning fasting  *Fasting blood sugar goal     FOLLOW-UP:    *PCP today    Time spent with patient at today's visit was 68 minutes.      Daysi Be RN, [facility]  Diabetes Education Department  [facility], [place]    Any diabetes medication initiation or dose changes were made via the [facility] Standing Orders per the patient's referring provider. A copy of this encounter was shared with the provider-Madeline.      Again, thank you for allowing me to participate in the care of your patient.        Sincerely,        Daysi Be RN    Electronically signed"

## 2025-01-02 NOTE — PATIENT INSTRUCTIONS
PLAN:  *Continue Jardiance  *Check glucose in the morning fasting  *Fasting blood sugar goal     FOLLOW-UP:    *PCP today

## 2025-01-03 NOTE — TELEPHONE ENCOUNTER
Attempted to call patient, but unable to hear each other. Please try to call back at a later time.    Thank you,  Vern, Triage RN [facility] [place]    10:22 AM 1/3/2025

## 2025-01-03 NOTE — TELEPHONE ENCOUNTER
Patient is calling and said yes she does use both of these medications. However she uses the magnesium PRN only when she feels like she needs it. It was recommend to her to take along with vitamin D  to help with brain function.

## 2025-01-09 NOTE — TELEPHONE ENCOUNTER
"Attempted to call patient, no answer and voicemail greeting is for \"Anais\". Left generic voicemail requesting return call to clinic.      If patient calls back, see message from Dr. Correa:  Her potassium was normal when last checked- please ask pt why she is on potassium? Who has prescribed this for her in the past     Shyanne Woodall RN  [facility]  "

## 2025-01-13 NOTE — TELEPHONE ENCOUNTER
Attempt #2. No answer. VM is full, not able to LVM.     If patient returns call, see message from Dr. Correa:  Her potassium was normal when last checked- please ask pt why she is on potassium? Who has prescribed this for her in the past?    Marcos KAN RN  [facility]

## 2025-01-15 ENCOUNTER — TELEPHONE (OUTPATIENT)
Dept: FAMILY MEDICINE | Facility: CLINIC | Age: 55
End: 2025-01-15
Payer: COMMERCIAL

## 2025-01-15 RX ORDER — MAGNESIUM 30 MG
30 TABLET ORAL 2 TIMES DAILY
OUTPATIENT
Start: 2025-01-15

## 2025-01-15 RX ORDER — POTASSIUM CHLORIDE 750 MG/1
TABLET, EXTENDED RELEASE ORAL 2 TIMES DAILY
OUTPATIENT
Start: 2025-01-15

## 2025-01-15 NOTE — TELEPHONE ENCOUNTER
Writer took in coming call from patient regarding initial message below. Patient states someone had called her today, the call was disconnected and so patient is calling back. There is no documentation today of clinic staff reaching out to patient today. Informed patient that writer can send a message to Care Coordination team to assist patient with the following medication issues in trying to obtain Vitamin B Complex. Patient states she is unable to afford this medication as all of her money goes to rent. Patient says she has been trying to deal with this issue since last year. Still no update from anyone yet.    Re routing message to [facility] to assist patient with this request.     Phoenix Weaver, GAVINON, RN, PHN   [facility]

## 2025-01-15 NOTE — TELEPHONE ENCOUNTER
Called patient, call was disconnected unexpectedly after RN introduction. Attempted to call patient back, left voicemail requesting return call to clinic.    If patient calls back, please ask pt why she is on potassium? Who has prescribed this for her in the past?    Closing encounter per protocol, sending message to pharmacy to have patient contact clinic for refills.     Shyanne Woodall RN  [facility]

## 2025-01-15 NOTE — TELEPHONE ENCOUNTER
Forms/Letter Request    Type of form/letter: Transportation ([employer])      Is Release of Information needed?: No    Do we have the form/letter: Yes: PCP in basket    Who is the form from? [employer] (if other please explain)    Where did/will the form come from? form was faxed in    When is form/letter needed by: ASAP    How would you like the form/letter returned: Fax : [phone]    Patient Notified form requests are processed in 5-7 business days:No

## 2025-01-22 ENCOUNTER — VIRTUAL VISIT (OUTPATIENT)
Dept: PHARMACY | Facility: CLINIC | Age: 55
End: 2025-01-22
Payer: COMMERCIAL

## 2025-01-22 DIAGNOSIS — E11.65 TYPE 2 DIABETES MELLITUS WITH HYPERGLYCEMIA, WITHOUT LONG-TERM CURRENT USE OF INSULIN (H): Primary | ICD-10-CM

## 2025-01-22 DIAGNOSIS — F39 MOOD DISORDER: ICD-10-CM

## 2025-01-22 DIAGNOSIS — Z78.9 TAKES DIETARY SUPPLEMENTS: ICD-10-CM

## 2025-01-22 PROCEDURE — 99605 MTMS BY PHARM NP 15 MIN: CPT | Mod: 93

## 2025-01-22 RX ORDER — MAGNESIUM OXIDE 400 MG/1
400 TABLET ORAL AT BEDTIME
Qty: 90 TABLET | Refills: 3 | Status: SHIPPED | OUTPATIENT
Start: 2025-01-22

## 2025-01-22 RX ORDER — DULOXETIN HYDROCHLORIDE 30 MG/1
30 CAPSULE, DELAYED RELEASE ORAL DAILY
COMMUNITY
Start: 2024-12-24 | End: 2025-01-22

## 2025-01-22 RX ORDER — DULOXETIN HYDROCHLORIDE 30 MG/1
30 CAPSULE, DELAYED RELEASE ORAL DAILY
Qty: 90 CAPSULE | Refills: 2 | Status: SHIPPED | OUTPATIENT
Start: 2025-01-22

## 2025-01-22 NOTE — PROGRESS NOTES
Medication Therapy Management (MTM) Encounter    ASSESSMENT:                            Medication Adherence/Access: No issues identified.    Diabetes Type II: A1c well above goal < 7%. No home BG at this time, but patient noted her BG has been improved. Advised patient to start taking januvia. Advised patient to eat healthy;more veggies and fruits, and less carbohydrates.     Depression and Mood Disorder:Unsure if these medication is working. Patient might benefit from neuropsych referral. Unsure if patient is following psychology or psychiatry.      Supplements: Continue taking current dietary supplements.Patient would want vegan vitamin B complex.      PLAN:                            PharmD to look for vegetarian B complex that have high amount of B 12 mcg   PharmD to send Cymbalta to the  mail order pharmacy     Follow-up: Due on when needed by patient or provider     SUBJECTIVE/OBJECTIVE:                          Brayan Baires is a 54 year old female seen for an initial visit. She was referred to me from Dr. Correa.      Reason for visit: Medication Review Initial     Allergies/ADRs: Reviewed in chart  Past Medical History: Reviewed in chart  Tobacco: She reports that she has never smoked. She has never been exposed to tobacco smoke. She has never used smokeless tobacco.    Medication Adherence/Access: no issues reported.    Diabetes  Type II:   Januvia 25 mg daily - has not started taking it yet  Not taking aspirin due to unsure why  Patient is not experiencing side effects.  Current diabetes symptoms: none  Diet/Exercise: She was eating honey and other natural food that has increased her sugar. She has been eating vegan food. Since COVID she has not been able to control her sugar. She worries about her mother and grandmother. She is a . Patient's mental health affects her sugar readings.     Blood sugar monitorin time(s) daily; Ranges: (patient reported)   Fasting- This has been under 160;  "patient noted it has been less 130.   Post-Prandial- No readings at this time    Eye exam in the last 12 months? No  Foot exam: due    Lab Results   Component Value Date    A1C 9.3 10/14/2024     Patient noted she discontinued jardiance due to side effects.   The following was noted during the last visit with Diabetes Education:     \"T2DM seen for comprehensive review at the request of Dr. Correa. A1C=9.3. Complex PMH. Assessment was very minimal/unproductive; answers were always off topic. Patient often referred to: confusion \"because of my brain,\" COVID, 1970's, disorder that needs sugar but gets sick, childhood drama, wanting to be in [employer], PTSD, and culture. Reports checking glucose twice daily with numbers as low as 106-270, states numbers are better when managed with vitamin B vitamins and Chromium BG regulator (states letter was written for approval). States Jardiance causes extreme fatigue and caused her to pass out, only taking 1/2 tab. She declines taking full tab. Declined GLP-1, doesn't want to do injections and with history of nausea I am hesitant to start. Did not tolerate Metformin due to burping.\"     Depression and Mood Disorder:   Duloxetine (Cymbalta) 30 mg daily   Patient has not started taking this medication a this point, but she requested it is refilled.   This medication was ordered by [facility]. Unsure how this medication is working. Unsure if patient is following psychology or psychiatry.        Supplements    Vitamin B complex daily - not started yet   Vitamin D 2 1250 ( 50,000 units) weekly      Today's Vitals: LMP 12/09/2024 (Approximate)   ----------------    I spent 25 minutes with this patient today. All changes were made via collaborative practice agreement with Carolynn Correa MD.     A summary of these recommendations was sent via [employer].      Telemedicine Visit Details  The patient's medications can be safely assessed via a telemedicine encounter.  Type of service:  Telephone " visit  Originating Location (pt. Location): Home    Distant Location (provider location):  On-site  Start Time:  3:00 PM  End Time:   3:25 PM     Medication Therapy Recommendations  No medication therapy recommendations to display     Akbar Davis, PharmD     Medication Therapy Management (MTM) Pharmacist     [phone]     [email or web address]

## 2025-01-22 NOTE — TELEPHONE ENCOUNTER
Pt is requesting new rx for    Chloraseptic sore throat 6-10 lozg    Did not see on active med list please verify and send new rx. Thank you!     [facility]  [phone]

## 2025-01-22 NOTE — TELEPHONE ENCOUNTER
I completed this with pt on 12/4/24 with phone visit- I do not see this scanned in yet.     I never got another form.

## 2025-01-22 NOTE — TELEPHONE ENCOUNTER
It looks like they picked that one up, not sure why they sent another one again. I will call and ask.

## 2025-01-22 NOTE — PATIENT INSTRUCTIONS
"Recommendations from today's MTM visit:                                                      MTM (medication therapy management) is a service provided by a clinical pharmacist designed to help you get the most of out of your medicines.   Today we reviewed what your medicines are for, how to know if they are working, that your medicines are safe and how to make your medicine regimen as easy as possible.      PharmD to look for vegetarian B complex that have high amount of B 12 mcg   PharmD to send Cymbalta to the  mail order pharmacy     Follow-up: Due on when needed by patient or provider     It was great speaking with you today.  I value your experience and would be very thankful for your time in providing feedback in our clinic survey. In the next few days, you may receive an email or text message from [employer] with a link to a survey related to your  clinical pharmacist.\"     To schedule another MTM appointment, please call the clinic directly or you may call the MTM scheduling line at [phone].    My Clinical Pharmacist's contact information:                                                      Please feel free to contact me with any questions or concerns you have.        Akbar Davis, PharmD     Medication Therapy Management (MTM) Pharmacist     [phone]     [email or web address]     [facility]  "

## 2025-01-23 RX ORDER — VITAMIN B COMPLEX
1 TABLET ORAL DAILY
Qty: 90 TABLET | Refills: 3 | Status: SHIPPED | OUTPATIENT
Start: 2025-01-23

## 2025-01-27 ENCOUNTER — TELEPHONE (OUTPATIENT)
Dept: FAMILY MEDICINE | Facility: CLINIC | Age: 55
End: 2025-01-27
Payer: COMMERCIAL

## 2025-01-27 NOTE — TELEPHONE ENCOUNTER
Patient Quality Outreach    Patient is due for the following:   Breast Cancer Screening - Mammogram    Action(s) Taken:   Schedule a Adult Preventative    Type of outreach:    Phone, spoke to patient/parent. Pt declined to schedule; not feeling well.  Okay for me to reach out in 90 days.    Questions for provider review:    None           Andrea Behrend

## 2025-01-27 NOTE — TELEPHONE ENCOUNTER
Dr Correa,     Pt reports coughing x 1 mth. Coughing is improved and nonproductive. Pt has occasional coughing spells, requesting for cherry flavored benocaine-mental chloraseptic.     Anaid ROBISON RN  [facility]

## 2025-02-03 ENCOUNTER — TELEPHONE (OUTPATIENT)
Dept: FAMILY MEDICINE | Facility: CLINIC | Age: 55
End: 2025-02-03
Payer: COMMERCIAL

## 2025-02-03 NOTE — TELEPHONE ENCOUNTER
New Medication Request      What medication are you requesting?: Chloraseptic sore throat 6-10 LOZG    Reason for medication request: NA    Have you taken this medication before?: NA    Controlled Substance Agreement on file:   CSA -- Patient Level:    CSA: None found at the patient level.         Patient offered an appointment? No    Preferred Pharmacy:   [facility] - [place], [place] - [facility] [address] [facility] [address]  Phone: [phone] Fax: [phone]      Okay to leave a detailed message?: Yes at Home number on file [phone] (home)

## 2025-02-05 ENCOUNTER — OFFICE VISIT (OUTPATIENT)
Dept: PHARMACY | Facility: CLINIC | Age: 55
End: 2025-02-05

## 2025-02-05 ENCOUNTER — TELEPHONE (OUTPATIENT)
Dept: GASTROENTEROLOGY | Facility: CLINIC | Age: 55
End: 2025-02-05

## 2025-02-05 ENCOUNTER — OFFICE VISIT (OUTPATIENT)
Dept: FAMILY MEDICINE | Facility: CLINIC | Age: 55
End: 2025-02-05
Payer: COMMERCIAL

## 2025-02-05 VITALS
HEIGHT: 60 IN | BODY MASS INDEX: 35.05 KG/M2 | OXYGEN SATURATION: 98 % | DIASTOLIC BLOOD PRESSURE: 75 MMHG | HEART RATE: 80 BPM | RESPIRATION RATE: 21 BRPM | SYSTOLIC BLOOD PRESSURE: 118 MMHG | TEMPERATURE: 97.8 F | WEIGHT: 178.5 LBS

## 2025-02-05 DIAGNOSIS — E11.65 TYPE 2 DIABETES MELLITUS WITH HYPERGLYCEMIA, WITHOUT LONG-TERM CURRENT USE OF INSULIN (H): Primary | ICD-10-CM

## 2025-02-05 DIAGNOSIS — Z28.21 NO VACCINATION-PT REFUSE: ICD-10-CM

## 2025-02-05 DIAGNOSIS — E55.9 VITAMIN D DEFICIENCY: ICD-10-CM

## 2025-02-05 DIAGNOSIS — Z12.4 CERVICAL CANCER SCREENING: ICD-10-CM

## 2025-02-05 DIAGNOSIS — F39 MOOD DISORDER: ICD-10-CM

## 2025-02-05 DIAGNOSIS — N39.46 MIXED STRESS AND URGE URINARY INCONTINENCE: ICD-10-CM

## 2025-02-05 DIAGNOSIS — N93.9 ABNORMAL VAGINAL BLEEDING: ICD-10-CM

## 2025-02-05 DIAGNOSIS — Z12.11 SCREEN FOR COLON CANCER: ICD-10-CM

## 2025-02-05 DIAGNOSIS — J06.9 URI WITH COUGH AND CONGESTION: ICD-10-CM

## 2025-02-05 DIAGNOSIS — N89.8 VAGINAL IRRITATION: ICD-10-CM

## 2025-02-05 LAB
ALBUMIN SERPL BCG-MCNC: 4.4 G/DL (ref 3.5–5.2)
ALBUMIN UR-MCNC: NEGATIVE MG/DL
ALP SERPL-CCNC: 78 U/L (ref 40–150)
ALT SERPL W P-5'-P-CCNC: 20 U/L (ref 0–50)
ANION GAP SERPL CALCULATED.3IONS-SCNC: 11 MMOL/L (ref 7–15)
APPEARANCE UR: CLEAR
AST SERPL W P-5'-P-CCNC: 25 U/L (ref 0–45)
BACTERIA #/AREA URNS HPF: ABNORMAL /HPF
BASOPHILS # BLD AUTO: 0 10E3/UL (ref 0–0.2)
BASOPHILS NFR BLD AUTO: 0 %
BILIRUB SERPL-MCNC: 0.3 MG/DL
BILIRUB UR QL STRIP: NEGATIVE
BUN SERPL-MCNC: 10.8 MG/DL (ref 6–20)
CALCIUM SERPL-MCNC: 9.8 MG/DL (ref 8.8–10.4)
CHLORIDE SERPL-SCNC: 102 MMOL/L (ref 98–107)
COLOR UR AUTO: YELLOW
CREAT SERPL-MCNC: 0.54 MG/DL (ref 0.51–0.95)
EGFRCR SERPLBLD CKD-EPI 2021: >90 ML/MIN/1.73M2
EOSINOPHIL # BLD AUTO: 0.1 10E3/UL (ref 0–0.7)
EOSINOPHIL NFR BLD AUTO: 2 %
ERYTHROCYTE [DISTWIDTH] IN BLOOD BY AUTOMATED COUNT: 12.1 % (ref 10–15)
EST. AVERAGE GLUCOSE BLD GHB EST-MCNC: 192 MG/DL
GLUCOSE SERPL-MCNC: 173 MG/DL (ref 70–99)
GLUCOSE UR STRIP-MCNC: NEGATIVE MG/DL
HBA1C MFR BLD: 8.3 % (ref 0–5.6)
HCO3 SERPL-SCNC: 25 MMOL/L (ref 22–29)
HCT VFR BLD AUTO: 42.5 % (ref 35–47)
HGB BLD-MCNC: 14.1 G/DL (ref 11.7–15.7)
HGB UR QL STRIP: NEGATIVE
IMM GRANULOCYTES # BLD: 0 10E3/UL
IMM GRANULOCYTES NFR BLD: 0 %
KETONES UR STRIP-MCNC: NEGATIVE MG/DL
LEUKOCYTE ESTERASE UR QL STRIP: NEGATIVE
LYMPHOCYTES # BLD AUTO: 2.8 10E3/UL (ref 0.8–5.3)
LYMPHOCYTES NFR BLD AUTO: 29 %
MAGNESIUM SERPL-MCNC: 1.8 MG/DL (ref 1.7–2.3)
MCH RBC QN AUTO: 27.7 PG (ref 26.5–33)
MCHC RBC AUTO-ENTMCNC: 33.2 G/DL (ref 31.5–36.5)
MCV RBC AUTO: 84 FL (ref 78–100)
MONOCYTES # BLD AUTO: 0.6 10E3/UL (ref 0–1.3)
MONOCYTES NFR BLD AUTO: 7 %
NEUTROPHILS # BLD AUTO: 5.8 10E3/UL (ref 1.6–8.3)
NEUTROPHILS NFR BLD AUTO: 62 %
NITRATE UR QL: NEGATIVE
PH UR STRIP: 6 [PH] (ref 5–8)
PLATELET # BLD AUTO: 282 10E3/UL (ref 150–450)
POTASSIUM SERPL-SCNC: 4 MMOL/L (ref 3.4–5.3)
PROT SERPL-MCNC: 7.6 G/DL (ref 6.4–8.3)
RBC # BLD AUTO: 5.09 10E6/UL (ref 3.8–5.2)
RBC #/AREA URNS AUTO: ABNORMAL /HPF
SODIUM SERPL-SCNC: 138 MMOL/L (ref 135–145)
SP GR UR STRIP: 1.02 (ref 1–1.03)
SQUAMOUS #/AREA URNS AUTO: ABNORMAL /LPF
UROBILINOGEN UR STRIP-ACNC: 0.2 E.U./DL
VIT B12 SERPL-MCNC: 376 PG/ML (ref 232–1245)
VIT D+METAB SERPL-MCNC: 34 NG/ML (ref 20–50)
WBC # BLD AUTO: 9.4 10E3/UL (ref 4–11)
WBC #/AREA URNS AUTO: ABNORMAL /HPF

## 2025-02-05 PROCEDURE — 36415 COLL VENOUS BLD VENIPUNCTURE: CPT | Performed by: FAMILY MEDICINE

## 2025-02-05 PROCEDURE — G2211 COMPLEX E/M VISIT ADD ON: HCPCS | Performed by: FAMILY MEDICINE

## 2025-02-05 PROCEDURE — 85025 COMPLETE CBC W/AUTO DIFF WBC: CPT | Performed by: FAMILY MEDICINE

## 2025-02-05 PROCEDURE — 82306 VITAMIN D 25 HYDROXY: CPT | Performed by: FAMILY MEDICINE

## 2025-02-05 PROCEDURE — 83735 ASSAY OF MAGNESIUM: CPT | Performed by: FAMILY MEDICINE

## 2025-02-05 PROCEDURE — 99214 OFFICE O/P EST MOD 30 MIN: CPT | Performed by: FAMILY MEDICINE

## 2025-02-05 PROCEDURE — 81001 URINALYSIS AUTO W/SCOPE: CPT | Performed by: FAMILY MEDICINE

## 2025-02-05 PROCEDURE — 99207 PR NO CHARGE LOS: CPT | Performed by: PHARMACIST

## 2025-02-05 PROCEDURE — 82607 VITAMIN B-12: CPT | Performed by: FAMILY MEDICINE

## 2025-02-05 PROCEDURE — 80053 COMPREHEN METABOLIC PANEL: CPT | Performed by: FAMILY MEDICINE

## 2025-02-05 PROCEDURE — 83036 HEMOGLOBIN GLYCOSYLATED A1C: CPT | Performed by: FAMILY MEDICINE

## 2025-02-05 RX ORDER — ERGOCALCIFEROL 1.25 MG/1
50000 CAPSULE, LIQUID FILLED ORAL WEEKLY
Qty: 12 CAPSULE | Refills: 4 | Status: SHIPPED | OUTPATIENT
Start: 2025-02-05

## 2025-02-05 RX ORDER — MAGNESIUM OXIDE 400 MG/1
400 TABLET ORAL AT BEDTIME
Qty: 90 TABLET | Refills: 3 | Status: SHIPPED | OUTPATIENT
Start: 2025-02-05 | End: 2025-02-05

## 2025-02-05 RX ORDER — DULOXETIN HYDROCHLORIDE 30 MG/1
30 CAPSULE, DELAYED RELEASE ORAL DAILY
Qty: 90 CAPSULE | Refills: 2 | Status: SHIPPED | OUTPATIENT
Start: 2025-02-05

## 2025-02-05 RX ORDER — DULOXETIN HYDROCHLORIDE 30 MG/1
30 CAPSULE, DELAYED RELEASE ORAL DAILY
Qty: 90 CAPSULE | Refills: 2 | Status: SHIPPED | OUTPATIENT
Start: 2025-02-05 | End: 2025-02-05

## 2025-02-05 RX ORDER — VITAMIN B COMPLEX
1 TABLET ORAL DAILY
Qty: 90 TABLET | Refills: 3 | Status: SHIPPED | OUTPATIENT
Start: 2025-02-05

## 2025-02-05 RX ORDER — DULOXETIN HYDROCHLORIDE 60 MG/1
60 CAPSULE, DELAYED RELEASE ORAL DAILY
COMMUNITY
Start: 2025-01-20 | End: 2025-02-05 | Stop reason: DRUGHIGH

## 2025-02-05 RX ORDER — FLUCONAZOLE 150 MG/1
150 TABLET ORAL ONCE
Qty: 1 TABLET | Refills: 0 | Status: SHIPPED | OUTPATIENT
Start: 2025-02-05 | End: 2025-02-05

## 2025-02-05 RX ORDER — MAGNESIUM OXIDE 400 MG/1
400 TABLET ORAL AT BEDTIME
Qty: 90 TABLET | Refills: 3 | Status: SHIPPED | OUTPATIENT
Start: 2025-02-05

## 2025-02-05 RX ORDER — BENZOCAINE 6 MG-MENTHOL 10 MG LOZENGES
1
Qty: 36 LOZENGE | Refills: 5 | Status: SHIPPED | OUTPATIENT
Start: 2025-02-05 | End: 2025-02-07

## 2025-02-05 NOTE — PROGRESS NOTES
Prior to immunization administration, verified patients identity using patient s name and date of birth. Please see Immunization Activity for additional information.     Screening Questionnaire for Adult Immunization    Are you sick today?   Yes   Do you have allergies to medications, food, a vaccine component or latex?   Yes   Have you ever had a serious reaction after receiving a vaccination?   Yes   Do you have a long-term health problem with heart, lung, kidney, or metabolic disease (e.g., diabetes), asthma, a blood disorder, no spleen, complement component deficiency, a cochlear implant, or a spinal fluid leak?  Are you on long-term aspirin therapy?   Yes   Do you have cancer, leukemia, HIV/AIDS, or any other immune system problem?   Yes   Do you have a parent, brother, or sister with an immune system problem?   Don't Know   In the past 3 months, have you taken medications that affect  your immune system, such as prednisone, other steroids, or anticancer drugs; drugs for the treatment of rheumatoid arthritis, Crohn s disease, or psoriasis; or have you had radiation treatments?   No   Have you had a seizure, or a brain or other nervous system problem?   Yes   During the past year, have you received a transfusion of blood or blood    products, or been given immune (gamma) globulin or antiviral drug?   No   For women: Are you pregnant or is there a chance you could become       pregnant during the next month?   No   Have you received any vaccinations in the past 4 weeks?   No     Immunization questionnaire answers were all     Patient instructed to remain in clinic for 15 minutes afterwards, and to report any adverse reactions.     Screening performed by Krunal Ibarra MA on 2/5/2025 at 9:22 AM.       Answers submitted by the patient for this visit:  General Questionnaire (Submitted on 2/5/2025)  Chief Complaint: Chronic problems general questions HPI Form  How many days per week do you miss taking your medication?:  2  What makes it hard for you to take your medication every day?: remembering to take  General Concern (Submitted on 2/5/2025)  Chief Complaint: Chronic problems general questions HPI Form  What is the reason for your visit today?: COUGH MRI JAW TIGHTNESS OF CHEST LUNGS PROVOKES COUGHING REQUEST BY PHARMASIST TO HAV A VIT D BLOD TEST GABRIELLA I HAV BEEN TAKING CAN CAUSE SPIKE LEVELS TEST AGAIN OF POTASIUM HAV NOT BEEN ABL TO CONSUME GET A HOL OF POTASIUM RICH FOODS CUT DOWN N POTATOES DU  When did your symptoms begin?: More than a month  What are your symptoms?: TIGHTNESS OF CHEST ALSO WAS TOLD TO REQUESTO HAV BLOD TEST FOR VIT D CAN CAUSE SPIKE IF OVR TAKING ALSO POTASIUM AGAIN HAV NOT BEEN ABL TO GET BANANAS RECENTLY FOODS RICH IN POTASIUM SINCE I HAV HAD TO CUT DOWN N POTATOES DUE TO DIABIETS  How would you describe these symptoms?: Severe  Are your symptoms:: Improving  Have you had these symptoms before?: Yes  Have you tried or received treatment for these symptoms before?: Yes  Did that treatment work? : Yes  Please describe the treatment you had:: antibiotics zinc vitC goldenseal echinacia garlic erik ginsing Bvits  Is there anything that makes you feel worse?: NOTaking the above list antibiotics last resort dairy mucus producing foods junk flavor enhacer foods processesed foods  Is there anything that makes you feel better?: WHOLE HOME COOKED NOT EXTRA ADDITIVES ADDED FOODS VEGAN RAW LIGHTLY COOKED FOODS SPROUTED PREFERABLY  Questionnaire about: Chronic problems general questions HPI Form (Submitted on 2/5/2025)  Chief Complaint: Chronic problems general questions HPI Form

## 2025-02-05 NOTE — PROGRESS NOTES
Clinical Pharmacy Consult:                                                    Bryaan Baires is a 54 year old female coming in for a clinical pharmacist consult.  She was referred to me from Carolynn Correa MD. Accompanied by roommate, Milana Rouse.     Reason for Consult: Med access difficulties    Discussion:   Patient in clinic to see PCP. Notes difficulties with getting medications filled. Prefers to have medicines mailed to her. Discussed referral to [facility]. They will reach out to patient monthly for refills. Patient agreeable to this plan. Prefers to have meds in bottles vs blister packs so she can still control administration.     Plan:  1. Referral to [facility].      Follow-Up:   Return in about 5 weeks (around 3/14/2025) for Medication Management Pharmacist, in person.   3/6 with PCP       Josef Tamayo PharmD  Medication Therapy Management (MTM) Pharmacist  [facility]

## 2025-02-05 NOTE — PROGRESS NOTES
Assessment & Plan     Type 2 diabetes mellitus with hyperglycemia, without long-term current use of insulin (H)  ***  - HEMOGLOBIN A1C  - blood glucose (NO BRAND SPECIFIED) test strip  Dispense: 100 strip; Refill: 6  - sitagliptin (JANUVIA) 25 MG tablet  Dispense: 30 tablet; Refill: 1  - magnesium oxide (MAG-OX) 400 MG tablet  Dispense: 90 tablet; Refill: 3  - Magnesium  - Comprehensive metabolic panel (BMP + Alb, Alk Phos, ALT, AST, Total. Bili, TP)  - CBC with platelets and differential    Screen for colon cancer  ***  - Colonoscopy Screening  Referral    Cervical cancer screening  ***    Vitamin D deficiency  ***  - Vitamin D Deficiency    Mood disorder  ***  - DULoxetine (CYMBALTA) 30 MG capsule  Dispense: 90 capsule; Refill: 2    No vaccination-pt refuse  ***    Vaginal irritation  ***  - UA with Microscopic reflex to Culture - Clinic Collect  - fluconazole (DIFLUCAN) 150 MG tablet  Dispense: 1 tablet; Refill: 0    Mixed stress and urge urinary incontinence  ***  - Incontinence Supplies Order for DME - ONLY FOR DME  - Incontinence Supplies Order for DME - ONLY FOR DME    URI with cough and congestion  ***  - benzocaine-menthol (CHLORASEPTIC SORE THROAT) 6-10 MG lozenge  Dispense: 36 lozenge; Refill: 5        The longitudinal plan of care for the diagnosis(es)/condition(s) as documented were addressed during this visit. Due to the added complexity in care, I will continue to support Amen in the subsequent management and with ongoing continuity of care.        Subjective   Amen is a 54 year old, presenting for the following health issues:  supplements        2/5/2025     9:09 AM   Additional Questions   Roomed by alexander   Accompanied by friend     History of Present Illness       Reason for visit:  COUGH MRI JAW TIGHTNESS OF CHEST LUNGS PROVOKES COUGHING REQUEST BY PHARMASIST TO HAV A VIT D BLOD TEST GABRIELLA I HAV BEEN TAKING CAN CAUSE SPIKE LEVELS TEST AGAIN OF POTASIUM HAV NOT BEEN ABL TO CONSUME GET A HOL  OF POTASIUM RICH FOODS CUT DOWN N POTATOES DU  Symptom onset:  More than a month  Symptoms include:  TIGHTNESS OF CHEST ALSO WAS TOLD TO REQUESTO HAV BLOD TEST FOR VIT D CAN CAUSE SPIKE IF OVR TAKING ALSO POTASIUM AGAIN HAV NOT BEEN ABL TO GET BANANAS RECENTLY FOODS RICH IN POTASIUM SINCE I HAV HAD TO CUT DOWN N POTATOES DUE TO DIABIETS  Symptom intensity:  Severe  Symptom progression:  Improving  Had these symptoms before:  Yes  Has tried/received treatment for these symptoms:  Yes  Previous treatment was successful:  Yes  Prior treatment description:  Antibiotics zinc vitC goldenseal echinacia garlic erik ginsing Bvits  What makes it worse:  NOTaking the above list antibiotics last resort dairy mucus producing foods junk flavor enhacer foods processesed foods  What makes it better:  WHOLE HOME COOKED NOT EXTRA ADDITIVES ADDED FOODS VEGAN RAW LIGHTLY COOKED FOODS SPROUTED PREFERABLY She is missing 2 dose(s) of medications per week.  She is not taking prescribed medications regularly due to remembering to take.     Needs refill on her chloraseptic lozenges   Last visit virtually 12/18/24 with me- reviewed.    Met with [facility] 12/24/24: fibromyalgia increased from 30mg to 60mg.  Recommend jardiance 25mg daily.      Had to cancel all of her appointments due to URI and cough     Diabetes education and MTM reviewed today     Taking jardiance 1/2 to 1/4 at bedtime depending on how she feelings.  It makes her feel very tired and warm.  Sugars are under 200  Wants to switch to januvia     Taking D3 1-2x/month, E, C when she remembers    Magnesium     PHQ-9 score:        10/14/2024     2:11 PM   PHQ   PHQ-9 Total Score 16   Q9: Thoughts of better off dead/self-harm past 2 weeks Not at all     Needs to find apt by May 2025     No period for past 2 months not sure if she has hot flashes.      Bad cold for past     Itch in vaginal area spreading to ears, neck and hair  Having to bath twice a day.    Urinary  "incontinence- needs rx for supplies.    Uses 4-10 pads/liners/day.    Needs bed pads for leaking at night.  5-7/day               Objective    /75 (BP Location: Left arm, Patient Position: Sitting, Cuff Size: Adult Regular)   Pulse 80   Temp 97.8  F (36.6  C) (Oral)   Resp 21   Ht 1.522 m (4' 11.92\")   Wt 81 kg (178 lb 8 oz)   LMP 12/09/2024 (Approximate)   SpO2 98%   BMI 34.95 kg/m    Body mass index is 34.95 kg/m .  Physical Exam   Complete 10 point ROS completed today as part of the exam and patient denies any symptoms as reviewed in HPI     Wt Readings from Last 3 Encounters:   02/05/25 81 kg (178 lb 8 oz)   12/18/24 81.2 kg (179 lb)   11/27/24 78.9 kg (174 lb)       Patient's last menstrual period was 12/09/2024 (approximate).    All normal as below except abnormalities include: ***  General is a  54 year old sitting comfortably in no apparent distress   HEENT:  TM are clear bilaterally.  Eye exams within normal   Neck: Supple without lymphadenopathy or thyromegally  CV: Regular rate and rhythm S1S2 without rubs, murmurs or gallops,   Lungs: Clear to auscultation bilaterally  Abd:  +BS, soft NT/ND,  No masses or organomegally,   Extremities: Warm, No Edema, 2+ Pedal and radial pulses bilaterally  Skin: No lesions or rashes noted  Neuro: Able to ambulate around the exam room with equal movement, strength and normal coordination of the upper and lower extremeties symmetrically  Pelvic:*** Normal external genitalia.  Healthy normal vaginal mucosa.  Health appearing cervix.  Testing obtained without pain or difficulty.      Breast: Normal breast exam.  No nipple changes, breast appear symmetric without nodules/lumps or skin changes. No lymphadenopathy noted.      Independent historian: ***    History summarized from1-2:***  Old Records-1: Outside allergies, meds, problems and immunizations were reconciled as needed from CareEverywhere  ***  Radiology tests reviewed-1: ***  Lab tests reviewed-1: " ***  Medicine tests reviewed-1: ***     Follow-up Visit   Expected date:  May 05, 2025 (Approximate)      Follow Up Appointment Details:     Follow-up with whom?: Me    Follow-Up for what?: Adult Preventive    Any Additional Chronic Condition Management?:  Diabetes  Hyperlipidemia  Depression       How?: In Person                 Carolynn Correa MD              Signed Electronically by: Carolynn Correa MD  {Email feedback regarding this note to [email or web address]   :[id number]}   Diabetes  Hyperlipidemia  Depression       How?: In Person                 Carolynn Correa MD              Signed Electronically by: Carolynn Correa MD

## 2025-02-05 NOTE — TELEPHONE ENCOUNTER
"Endoscopy Scheduling Screen    Have you had any respiratory illness or flu-like symptoms in the last 10 days?  Yes (Schedule at least 10 days from symptom onset)    What is your communication preference for Instructions and/or Bowel Prep?   Mail/USPS    What insurance is in the chart?  Other:  [facility]    Ordering/Referring Provider:     ORLY LOZA      (If ordering provider performs procedure, schedule with ordering provider unless otherwise instructed. )    BMI: Estimated body mass index is 34.95 kg/m  as calculated from the following:    Height as of an earlier encounter on 2/5/25: 1.522 m (4' 11.92\").    Weight as of an earlier encounter on 2/5/25: 81 kg (178 lb 8 oz).     Sedation Ordered  moderate sedation.   If patient BMI > 50 do not schedule in [facility].    If patient BMI > 45 do not schedule at [facility].    Are you taking methadone or Suboxone?  NO, No RN review required.    Have you been diagnosed and are being treated for severe PTSD or severe anxiety?  NO, No RN review required.    Are you taking any prescription medications for pain 3 or more times per week?   NO, No RN review required.    Do you have a history of malignant hyperthermia?  No    (Females) Are you currently pregnant?        Have you been diagnosed or told you have pulmonary hypertension?   No    Do you have an LVAD?  No    Have you been told you have moderate to severe sleep apnea?  No.    Have you been told you have COPD, asthma, or any other lung disease?  Yes     What breathing problems do you have?  Asthma     Do you use home oxygen?  No    Have your breathing problems required an ED visit or hospitalization in the last year?  No.    Do you have any heart conditions?  No     Have you ever had or are you waiting for an organ transplant?  No. Continue scheduling, no site restrictions.    Have you had a stroke or transient ischemic attack (TIA aka \"mini stroke\" in the last 6 months?   No    Have you been diagnosed with or been told you have " "cirrhosis of the liver?   No.    Are you currently on dialysis?   No    Do you need assistance transferring?   No    BMI: Estimated body mass index is 34.95 kg/m  as calculated from the following:    Height as of an earlier encounter on 2/5/25: 1.522 m (4' 11.92\").    Weight as of an earlier encounter on 2/5/25: 81 kg (178 lb 8 oz).     Is patients BMI > 40 and scheduling location UP?  No    Do you take an injectable or oral medication for weight loss or diabetes (excluding insulin)?  No    Do you take the medication Naltrexone?  No    Do you take blood thinners?  No       Prep   Are you currently on dialysis or do you have chronic kidney disease?  No    Do you have a diagnosis of diabetes?  Yes (Golytely Prep)    Do you have a diagnosis of cystic fibrosis (CF)?  No    On a regular basis do you go 3 -5 days between bowel movements?  No    BMI > 40?  No    Preferred Pharmacy:    [facility] [place] [address] - [place] - [address]  [address] [place] [address] [address]  [place] [address]  Phone: [phone] Fax: [phone]      Final Scheduling Details     Procedure scheduled  Colonoscopy    Surgeon:  JESUS     Date of procedure:  4/16     Pre-OP / PAC:   No - Not required for this site.    Location  [facility] - Per order.    Sedation   MAC/Deep Sedation  PH      Patient Reminders:   You will receive a call from a Nurse to review instructions and health history.  This assessment must be completed prior to your procedure.  Failure to complete the Nurse assessment may result in the procedure being cancelled.      On the day of your procedure, please designate an adult(s) who can drive you home stay with you for the next 24 hours. The medicines used in the exam will make you sleepy. You will not be able to drive.      You cannot take public transportation, ride share services, or non-medical taxi service without a responsible caregiver.  Medical transport services are allowed with the requirement " that a responsible caregiver will receive you at your destination.  We require that drivers and caregivers are confirmed prior to your procedure.

## 2025-02-05 NOTE — PROGRESS NOTES
Prior to immunization administration, verified patients identity using patient s name and date of birth. Please see Immunization Activity for additional information.     Screening Questionnaire for Adult Immunization    Are you sick today?   Yes   Do you have allergies to medications, food, a vaccine component or latex?   Yes   Have you ever had a serious reaction after receiving a vaccination?   Yes   Do you have a long-term health problem with heart, lung, kidney, or metabolic disease (e.g., diabetes), asthma, a blood disorder, no spleen, complement component deficiency, a cochlear implant, or a spinal fluid leak?  Are you on long-term aspirin therapy?   Yes   Do you have cancer, leukemia, HIV/AIDS, or any other immune system problem?   Yes   Do you have a parent, brother, or sister with an immune system problem?   Don't Know   In the past 3 months, have you taken medications that affect  your immune system, such as prednisone, other steroids, or anticancer drugs; drugs for the treatment of rheumatoid arthritis, Crohn s disease, or psoriasis; or have you had radiation treatments?   No   Have you had a seizure, or a brain or other nervous system problem?   Yes   During the past year, have you received a transfusion of blood or blood    products, or been given immune (gamma) globulin or antiviral drug?   No   For women: Are you pregnant or is there a chance you could become       pregnant during the next month?   No   Have you received any vaccinations in the past 4 weeks?   No     Immunization questionnaire was positive for at least one answer.  Notified .      Patient instructed to remain in clinic for 15 minutes afterwards, and to report any adverse reactions.     Screening performed by Krunal Ibarra MA on 2/5/2025 at 9:24 AM.       Answers submitted by the patient for this visit:  General Questionnaire (Submitted on 2/5/2025)  Chief Complaint: Chronic problems general questions HPI Form  How many days per week do  you miss taking your medication?: 2  What makes it hard for you to take your medication every day?: remembering to take  General Concern (Submitted on 2/5/2025)  Chief Complaint: Chronic problems general questions HPI Form  What is the reason for your visit today?: COUGH MRI JAW TIGHTNESS OF CHEST LUNGS PROVOKES COUGHING REQUEST BY PHARMASIST TO HAV A VIT D BLOD TEST GABRIELLA I HAV BEEN TAKING CAN CAUSE SPIKE LEVELS TEST AGAIN OF POTASIUM HAV NOT BEEN ABL TO CONSUME GET A HOL OF POTASIUM RICH FOODS CUT DOWN N POTATOES DU  When did your symptoms begin?: More than a month  What are your symptoms?: TIGHTNESS OF CHEST ALSO WAS TOLD TO REQUESTO HAV BLOD TEST FOR VIT D CAN CAUSE SPIKE IF OVR TAKING ALSO POTASIUM AGAIN HAV NOT BEEN ABL TO GET BANANAS RECENTLY FOODS RICH IN POTASIUM SINCE I HAV HAD TO CUT DOWN N POTATOES DUE TO DIABIETS  How would you describe these symptoms?: Severe  Are your symptoms:: Improving  Have you had these symptoms before?: Yes  Have you tried or received treatment for these symptoms before?: Yes  Did that treatment work? : Yes  Please describe the treatment you had:: antibiotics zinc vitC goldenseal echinacia garlic erik ginsing Bvits  Is there anything that makes you feel worse?: NOTaking the above list antibiotics last resort dairy mucus producing foods junk flavor enhacer foods processesed foods  Is there anything that makes you feel better?: WHOLE HOME COOKED NOT EXTRA ADDITIVES ADDED FOODS VEGAN RAW LIGHTLY COOKED FOODS SPROUTED PREFERABLY  Questionnaire about: Chronic problems general questions HPI Form (Submitted on 2/5/2025)  Chief Complaint: Chronic problems general questions HPI Form

## 2025-02-06 NOTE — TELEPHONE ENCOUNTER
Form completed by provider. ICD-10 problem list attached. Faxed back to [facility] at [phone]. [facility]. Completing task.  Lore Tang

## 2025-02-07 ENCOUNTER — TELEPHONE (OUTPATIENT)
Dept: FAMILY MEDICINE | Facility: CLINIC | Age: 55
End: 2025-02-07

## 2025-02-07 ENCOUNTER — TELEPHONE (OUTPATIENT)
Dept: FAMILY MEDICINE | Facility: CLINIC | Age: 55
End: 2025-02-07
Payer: COMMERCIAL

## 2025-02-07 DIAGNOSIS — E55.9 VITAMIN D DEFICIENCY: ICD-10-CM

## 2025-02-07 DIAGNOSIS — E11.65 TYPE 2 DIABETES MELLITUS WITH HYPERGLYCEMIA, WITHOUT LONG-TERM CURRENT USE OF INSULIN (H): ICD-10-CM

## 2025-02-07 DIAGNOSIS — J06.9 URI WITH COUGH AND CONGESTION: ICD-10-CM

## 2025-02-07 DIAGNOSIS — F39 MOOD DISORDER: ICD-10-CM

## 2025-02-07 RX ORDER — LANCETS
EACH MISCELLANEOUS
Qty: 100 EACH | Refills: 1 | Status: SHIPPED | OUTPATIENT
Start: 2025-02-07

## 2025-02-07 RX ORDER — MAGNESIUM OXIDE 400 MG/1
400 TABLET ORAL AT BEDTIME
Qty: 90 TABLET | Refills: 3 | Status: SHIPPED | OUTPATIENT
Start: 2025-02-07

## 2025-02-07 RX ORDER — DULOXETIN HYDROCHLORIDE 30 MG/1
30 CAPSULE, DELAYED RELEASE ORAL DAILY
Qty: 90 CAPSULE | Refills: 2 | Status: SHIPPED | OUTPATIENT
Start: 2025-02-07

## 2025-02-07 RX ORDER — ERGOCALCIFEROL 1.25 MG/1
50000 CAPSULE, LIQUID FILLED ORAL WEEKLY
Qty: 12 CAPSULE | Refills: 4 | Status: SHIPPED | OUTPATIENT
Start: 2025-02-07

## 2025-02-07 RX ORDER — BENZOCAINE 6 MG-MENTHOL 10 MG LOZENGES
1
Qty: 36 LOZENGE | Refills: 5 | Status: SHIPPED | OUTPATIENT
Start: 2025-02-07

## 2025-02-07 RX ORDER — VITAMIN B COMPLEX
1 TABLET ORAL DAILY
Qty: 90 TABLET | Refills: 3 | Status: SHIPPED | OUTPATIENT
Start: 2025-02-07 | End: 2025-02-13

## 2025-02-07 NOTE — TELEPHONE ENCOUNTER
Patient calling initially to inform [place] about [facility] refusing to accept her as a patient after a conversation about getting the correct b-vitamin sent  [place] ended up hanging up on her per patient  In the end, patient decided to transfer all prescriptions to a [facility].  Writer sent these over as written   This will not be a long-term option due to transportation barriers but she needs test strips filled now (has 5 left)  Patient went into lengthy detail about the specific b-vitamin that they need due to being a vegan   She states that there have been discussions about this in the past and she was just referred to [facility]'s website which does not let her search the brands that are covered so she can find one that is vegan   Advised that writer is not aware of anything further that can be done tonight   The prescription is written as generic so she could inquire at [facility] to see if they have any vegan b-vitamins that [facility] covers   Otherwise writer informed patient she will reach out to Josef    Throughout the duration of this call patient frequently switched topics and was hard to re-direct. Patient did end the call expressing appreciation for the help.     Юлия Mcwilliams RN  [facility]  35 minute call

## 2025-02-07 NOTE — TELEPHONE ENCOUNTER
Received communication from [facility] earlier today that she was calling constantly, disrespectful to staff. Was lecturing the pharmacy  staff on politics and [employer]. Told the technician she needs a lesson. Pharmacy has declined to accept new referral.

## 2025-02-12 NOTE — TELEPHONE ENCOUNTER
Central Prior Authorization Team   Phone: [phone]    PA Initiation    Medication: Januvia 25MG tablets  Insurance Company: [facility] - Phone [phone] Fax [phone]  Pharmacy Filling the Rx: [facility] #[id number] - [place], [place] - [address] [place] [address]  Filling Pharmacy Phone: [phone]  Filling Pharmacy Fax:    Start Date: 2/12/2025    [facility] KEY: [id number]

## 2025-02-13 ENCOUNTER — NURSE TRIAGE (OUTPATIENT)
Dept: FAMILY MEDICINE | Facility: CLINIC | Age: 55
End: 2025-02-13

## 2025-02-13 ENCOUNTER — TELEPHONE (OUTPATIENT)
Dept: FAMILY MEDICINE | Facility: CLINIC | Age: 55
End: 2025-02-13

## 2025-02-13 DIAGNOSIS — E11.65 TYPE 2 DIABETES MELLITUS WITH HYPERGLYCEMIA, WITHOUT LONG-TERM CURRENT USE OF INSULIN (H): ICD-10-CM

## 2025-02-13 RX ORDER — VITAMIN B COMPLEX
1 TABLET ORAL DAILY
Qty: 90 TABLET | Refills: 2 | Status: SHIPPED | OUTPATIENT
Start: 2025-02-13

## 2025-02-13 NOTE — TELEPHONE ENCOUNTER
Prior Authorization Approval    Authorization Effective Date: 2/12/2025  Authorization Expiration Date: 2/12/2026  Medication: Januvia 25MG tablets  Reference #:     Insurance Company: [employer] - Phone [phone] Fax [phone]  Which Pharmacy is filling the prescription (Not needed for infusion/clinic administered): [id number] - [place], [place] - [address] [place] [address]  Pharmacy Notified: Yes  Patient Notified: Instructed pharmacy to notify patient when script is ready to /ship.

## 2025-02-13 NOTE — TELEPHONE ENCOUNTER
Writer spoke with patient this afternoon (see 2/13/25 nurse triage encounter).    Patient requested assistance from Care Coordination regarding the following:    -[facility].    -Writer reviewed 12/13/24 and 12/16/24 Patient Outreach encounters.    -Reviewed with patient of the information documented in those encounters regarding referrals placed by Care Coordination for resources/services.    -Patient informed writer she does not remember all of this information and would like help getting connected to [facility] worker and CADI waiver services.    -Patient shared she is paying for apartment rent as month-to-month and does not have a lease. Needs help with housing.  Cannot afford raise in rent.      Writer informed patient writer will send message to Care Coordination.    Thank you!  GAVINO GonzalezN, RN-[facility]

## 2025-02-13 NOTE — TELEPHONE ENCOUNTER
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  Never got results for 11/27/24 x-rays    Patient's speech was rapid, pressured and tangential during phone call and writer had difficulty getting direct answers from patient.  Triaging patient was challenging.  Writer would ask questions and patient would respond speaking about a different topic.  Patient went into detail sharing with writer her medical history and housing challenges.    Patient spoke in full, complete sentences during phone call.  Writer offered appt with Dr. May today at 1500 to which patient agreed.  Visit date, time and location confirmed with patient.    Writer offered to resend Vitamin B complex orders to [facility] with notation to dispense Nature Made and see if that changes coverage, as order sent 2/7/25 does not specify Nature Made brand.  Patient agreeable to this plan and [facility]    Writer informed patient x-ray result letter was mailed around 12/6/24.  Patient verbalized understanding and stated she never received this letter.  Writer informed patient writer will mail letter.  Address confirmed with patient.      Patient stated she needs [facility] worker and CADI waver.  " Writer reviewed 12/13/24 and 12/16/24 Patient Outreach encounters.  Informed patient   Paying for apartment rent as month-to-month and does not have a lease. Needs help with housing.  Cannot afford raise in rent.  See 2/13/25 telephone encounter.    12/6/24 letter printed and placed in outgoing mail bin.    Phone call duration: 40 minutes.      MARIA Gonzalez, RN-[facility]      Additional Information   Negative: SEVERE difficulty breathing (e.g., struggling for each breath, speaks in single words)   Negative: Difficult to awaken or acting confused (e.g., disoriented, slurred speech)   Negative: Shock suspected (e.g., cold/pale/clammy skin, too weak to stand, low BP, rapid pulse)   Negative: Passed out (i.e., lost consciousness, collapsed and was not responding)   Negative: Chest pain lasting longer than 5 minutes and ANY of the following:         Pain is crushing, pressure-like, or heavy         Over 44 years old          Over 30 years old and one cardiac risk factor (e.g diabetes, high blood pressure, high cholesterol, smoker, or family history of heart disease)         History of heart disease (e.g. angina, heart attack, heart failure, bypass surgery, takes nitroglycerin)   Negative: Heart beating < 50 beats per minute OR > 140 beats per minute   Negative: Visible sweat on face or sweat dripping down face   Negative: Sounds like a life-threatening emergency to the triager   Negative: Followed an injury to chest   Negative: SEVERE chest pain    Protocols used: Chest Pain-A-[place]

## 2025-02-16 RX ORDER — LANOLIN ALCOHOL/MO/W.PET/CERES
1000 CREAM (GRAM) TOPICAL DAILY
Qty: 90 TABLET | Refills: 4 | Status: SHIPPED | OUTPATIENT
Start: 2025-02-16

## 2025-02-17 ENCOUNTER — PATIENT OUTREACH (OUTPATIENT)
Dept: CARE COORDINATION | Facility: CLINIC | Age: 55
End: 2025-02-17
Payer: COMMERCIAL

## 2025-02-17 ENCOUNTER — VIRTUAL VISIT (OUTPATIENT)
Dept: INTERNAL MEDICINE | Facility: CLINIC | Age: 55
End: 2025-02-17
Payer: COMMERCIAL

## 2025-02-17 ENCOUNTER — TELEPHONE (OUTPATIENT)
Dept: FAMILY MEDICINE | Facility: CLINIC | Age: 55
End: 2025-02-17
Payer: COMMERCIAL

## 2025-02-17 DIAGNOSIS — J45.20 MILD INTERMITTENT ASTHMA, UNSPECIFIED WHETHER COMPLICATED: Primary | ICD-10-CM

## 2025-02-17 PROCEDURE — 98009 SYNCH AUDIO-ONLY NEW LOW 30: CPT | Performed by: NURSE PRACTITIONER

## 2025-02-17 NOTE — TELEPHONE ENCOUNTER
Contacted patient with message below  US scheduled on 2/24/25  Contacted [facility] and B 12 and B Complex vitamins are not covered by insurance. Per pharmacy,prior authorization will not approve.  [facility] will contact patient  No further action needed    Sarita Sanchez RN  [employer]    ----- Message from Carolynn Correa sent at 2/16/2025 11:39 AM CST -----  Team - please call patient with results and send result letter with this information if patient desires for their records. Refer to [facility] result note as needed.      Her liver and kidney tests are healthy     Sugars are doing better but still not at goal control     Normal magnesium level - stay on magnesium supplement daily     Vitamin D is better- stay on supplement    B12 level is low- I sent in the b12 supplement so hopefully pharamcy will get this corrected.  Can take both the B complex and b12 supplement     Blood counts are normal- no anemia     Normal urine test- no infection     I have ordered a pelvic ultrasound to check on your uterus- please call [phone] to get this scheduled.

## 2025-02-17 NOTE — PROGRESS NOTES
Brayan is a 54 year old who is being evaluated via a billable telephone visit.    What phone number would you like to be contacted at? [phone]  How would you like to obtain your AVS? Mail a copy  Originating Location (pt. Location): Other neighbors'    Distant Location (provider location):  On-site  Telephone visit completed due to the patient did not have access to video, while the distant provider did.    Assessment & Plan     Mild intermittent asthma, unspecified whether complicated    -Due to chest pain, tightness, shortness of breath I recommend in person evaluation.  Discussed options are to go to the emergency room or could start at urgent care for an eval and then determine if she needs to go to the emergency room.  Patient is not short of breath on exam and talks in full sentences without difficulty.  She is agreeable to go in to be evaluated, discussed I am not able to do this over a phone visit.  She also plans to follow-up with her primary in regards to neck and jaw pain which has been more of a chronic issue.  She does request I put in a referral to pulmonology which has been placed.      -continue otc supplements as needed, may benefit from inhaler but will let ER/urgent care provider determine this.    - Adult Pulmonary Medicine  Referral; Future          Subjective   Brayan is a 54 year old, presenting for the following health issues:  Asthma        2/17/2025    10:41 AM   Additional Questions   Roomed by Selina NUGENT   Accompanied by Friend if needed         2/17/2025    10:41 AM   Patient Reported Additional Medications   Patient reports taking the following new medications No     History of Present Illness       Reason for visit:  COUGH MRI JAW TIGHTNESS OF CHEST LUNGS PROVOKES COUGHING REQUEST BY PHARMASIST TO HAV A VIT D BLOD TEST GABRIELLA I HAV BEEN TAKING CAN CAUSE SPIKE LEVELS TEST AGAIN OF POTASIUM HAV NOT BEEN ABL TO CONSUME GET A HOL OF POTASIUM RICH FOODS CUT DOWN N POTATOES DU  Symptom  onset:  More than a month  Symptoms include:  TIGHTNESS OF CHEST ALSO WAS TOLD TO REQUESTO HAV BLOD TEST FOR VIT D CAN CAUSE SPIKE IF OVR TAKING ALSO POTASIUM AGAIN HAV NOT BEEN ABL TO GET BANANAS RECENTLY FOODS RICH IN POTASIUM SINCE I HAV HAD TO CUT DOWN N POTATOES DUE TO DIABIETS  Symptom intensity:  Severe  Symptom progression:  Improving  Had these symptoms before:  Yes  Has tried/received treatment for these symptoms:  Yes  Previous treatment was successful:  Yes  Prior treatment description:  Antibiotics zinc vitC goldenseal echinacia garlic erik ginsing Bvits  What makes it worse:  NOTaking the above list antibiotics last resort dairy mucus producing foods junk flavor enhacer foods processesed foods  What makes it better:  WHOLE HOME COOKED NOT EXTRA ADDITIVES ADDED FOODS VEGAN RAW LIGHTLY COOKED FOODS SPROUTED PREFERABLY    She eats 4 or more servings of fruits and vegetables daily.She consumes 0 sweetened beverage(s) daily.She exercises with enough effort to increase her heart rate 9 or less minutes per day.  She exercises with enough effort to increase her heart rate 3 or less days per week. She is missing 2 dose(s) of medications per week.  She is not taking prescribed medications regularly due to remembering to take.       Patient was contacted today for virtual visit for asthma.  Patient is very difficult to follow constantly jumping from one topic to another.  From what I gathered from her she has longstanding history of controlled environmental asthma that is usually triggered by some environmental factor.  She reports she is not on an inhaler for this and just treats the symptoms when they arise.  She notes that she has been having problems with chest pain and tightness with activity along with burning in her neck and jaw.  She reports these have been going on for quite a while and she had recently seen her PCP regarding this.  She reports now she is having severe shortness of breath, chest  tightness and chest pain.  She also was having cough.  She has been doing herbs and natural remedies which have not seemed to help.  She does report adding zinc has improved symptoms a little bit.  She did have a x-ray in November which was negative, she is upset because no one told her about these results.                Objective    Vitals - Patient Reported  Weight (Patient Reported): 80.7 kg (178 lb)  Pain Score: Severe Pain (7)  Pain Loc: Other - see comment (chest with cough)      Physical Exam   General: Alert and no distress //Respiratory: No audible wheeze, or shortness of breath, occasional cough during appointment today          Phone call duration: 15 minutes  Signed Electronically by: Shahida Teran CNP

## 2025-02-17 NOTE — PROGRESS NOTES
Clinic Care Coordination Contact  [facility]/Voicemail    Clinical Data: Care Coordinator Outreach    Outreach Documentation Number of Outreach Attempt   2/17/2025   8:42 AM 1       Left message on patient's voicemail with call back information and requested return call.      Plan: Care Coordinator will try to reach patient again in 10 business days.      NYLA Bowers, [facility]  Social Work Care Coordinator

## 2025-03-24 ENCOUNTER — TELEPHONE (OUTPATIENT)
Dept: FAMILY MEDICINE | Facility: CLINIC | Age: 55
End: 2025-03-24
Payer: COMMERCIAL

## 2025-03-24 NOTE — TELEPHONE ENCOUNTER
Forms/Letter Request    Type of form/letter: OTHER: Exception to coverage request       Do we have the form/letter: Yes: PCP in basket    Who is the form from? Insurance comp    Where did/will the form come from? form was faxed in    When is form/letter needed by: ASAP    How would you like the form/letter returned: Fax : [phone]    Patient Notified form requests are processed in 5-7 business days:No    Okay to leave a detailed message?: Yes at Home number on file [phone] (home)

## 2025-03-25 NOTE — TELEPHONE ENCOUNTER
Reviewed form- it is completely blank.  Not sure what this is about.      Please call pt to see what this form is for

## 2025-03-27 NOTE — TELEPHONE ENCOUNTER
Message routed to TC team for review    Sarita Sanchez RN  [facility]    Dr Correa  Reviewed form- it is completely blank.  Not sure what this is about.      Please call pt to see what this form is for      ROS:  GENERAL: No fever, no chills  EYES: no change in vision  HEENT: no trouble swallowing, no trouble speaking  CARDIAC: no chest pain  PULMONARY: no cough, no shortness of breath  GI: no abdominal pain, no nausea, no vomiting, no diarrhea, no constipation  : No dysuria, no frequency, no change in appearance, or odor of urine  SKIN: no rashes  NEURO: no headache, no weakness  MSK: +back pain     Narendra Bah, DO

## 2025-03-31 NOTE — TELEPHONE ENCOUNTER
Called pharmacy - no issues running claim through patient insurance. Med order sent with qty 90, 4 refills. Closing encounter as no action is needed.    Shyanne Woodall RN  [facility]

## 2025-04-01 ENCOUNTER — VIRTUAL VISIT (OUTPATIENT)
Dept: PHARMACY | Facility: CLINIC | Age: 55
End: 2025-04-01
Payer: COMMERCIAL

## 2025-04-01 DIAGNOSIS — Z78.9 TAKES DIETARY SUPPLEMENTS: ICD-10-CM

## 2025-04-01 DIAGNOSIS — E11.65 TYPE 2 DIABETES MELLITUS WITH HYPERGLYCEMIA, WITHOUT LONG-TERM CURRENT USE OF INSULIN (H): Primary | ICD-10-CM

## 2025-04-01 DIAGNOSIS — F39 MOOD DISORDER: ICD-10-CM

## 2025-04-01 PROCEDURE — 99606 MTMS BY PHARM EST 15 MIN: CPT | Mod: 93

## 2025-04-01 PROCEDURE — 99607 MTMS BY PHARM ADDL 15 MIN: CPT | Mod: 93

## 2025-04-01 RX ORDER — LANOLIN ALCOHOL/MO/W.PET/CERES
1000 CREAM (GRAM) TOPICAL DAILY
Qty: 90 TABLET | Refills: 4 | Status: SHIPPED | OUTPATIENT
Start: 2025-04-01

## 2025-04-01 RX ORDER — DULOXETIN HYDROCHLORIDE 20 MG/1
20 CAPSULE, DELAYED RELEASE ORAL DAILY
Qty: 90 CAPSULE | Refills: 2 | Status: SHIPPED | OUTPATIENT
Start: 2025-04-01

## 2025-04-01 RX ORDER — MAGNESIUM OXIDE 400 MG/1
400 TABLET ORAL AT BEDTIME
Qty: 90 TABLET | Refills: 3 | Status: SHIPPED | OUTPATIENT
Start: 2025-04-01

## 2025-04-01 RX ORDER — VITAMIN B COMPLEX
1 TABLET ORAL DAILY
Qty: 90 TABLET | Refills: 2 | Status: SHIPPED | OUTPATIENT
Start: 2025-04-01

## 2025-04-01 RX ORDER — ERGOCALCIFEROL 1.25 MG/1
50000 CAPSULE, LIQUID FILLED ORAL WEEKLY
Qty: 12 CAPSULE | Refills: 0 | Status: SHIPPED | OUTPATIENT
Start: 2025-04-01

## 2025-04-01 NOTE — PATIENT INSTRUCTIONS
"Recommendations from today's MTM visit:                                                      MTM (medication therapy management) is a service provided by a clinical pharmacist designed to help you get the most of out of your medicines.   Today we reviewed what your medicines are for, how to know if they are working, that your medicines are safe and how to make your medicine regimen as easy as possible.      PharmD to do PA for vegan B complex   Re-start taking Januvia, vitamin, and duloxetine     Follow-up: Due 04/11/2025 @ 08:30 AM    It was great speaking with you today.  I value your experience and would be very thankful for your time in providing feedback in our clinic survey. In the next few days, you may receive an email or text message from [employer] with a link to a survey related to your  clinical pharmacist.\"     To schedule another MTM appointment, please call the clinic directly or you may call the MTM scheduling line at [phone].    My Clinical Pharmacist's contact information:                                                      Please feel free to contact me with any questions or concerns you have.        Akbar Davis, PharmD     Medication Therapy Management (MTM) Pharmacist     [phone]     [email or web address]  "

## 2025-04-01 NOTE — PROGRESS NOTES
Medication Therapy Management (MTM) Encounter    ASSESSMENT:                            Medication Adherence/Access: No issues identified.    Diabetes Type II: A1c well above goal < 7%. No home BG at this time, but patient noted her BG has been improved. Advised patient to start taking januvia. Advised patient to eat healthy;more veggies and fruits, and less carbohydrates.     Depression and Mood Disorder:Patient has not started taking duloxetine yet;patient would benefit from Psych referral. Would communicate with PCP for referral.      Supplements: Continue taking current dietary supplements.Patient would want vegan vitamin B complex.  PA was submitted.     PLAN:                            PharmD to do PA for vegan B complex   Re-start taking Januvia, vitamin, and duloxetine     Follow-up: Due 04/11/2025 @ 08:30 AM    SUBJECTIVE/OBJECTIVE:                          Brayan Baires is a 54 year old female seen for an initial visit. She was referred to me from Dr. Correa.      Reason for visit: Medication Review Initial     Allergies/ADRs: Reviewed in chart  Past Medical History: Reviewed in chart  Tobacco: She reports that she has never smoked. She has never been exposed to tobacco smoke. She has never used smokeless tobacco.    Medication Adherence/Access: no issues reported.    Diabetes  Type II:   Januvia 25 mg daily - has not started taking it yet  Not taking aspirin due to unsure why  Patient is not experiencing side effects.  Current diabetes symptoms: none  Diet/Exercise: She was eating honey and other natural food that has increased her sugar. She has been eating vegan food. Since COVID she has not been able to control her sugar. She worries about her mother and grandmother. She is a . Patient's mental health affects her sugar readings. Patient noted she need to improve her diet;she is eating crackers and cake. She noted she should go back to her vegan diet to improve. She noted she need to stop chips,  corn, crackers and other sugar food.       Blood sugar monitorin time(s) daily; Ranges: (patient reported)   Fasting- This has been under 160; patient noted it has been less 130.   Post-Prandial- No readings at this time    Eye exam in the last 12 months? No  Foot exam: due    Depression/Mood Disorder/Chronic Neck Pain:   Duloxetine (Cymbalta) 30 mg daily   Patient has not started taking this medication a this point, but she requested it is refilled.   This medication was ordered by [facility]. Unsure how this medication is working. Unsure if patient is following psychology or psychiatry.   - Patient noted her brain is burning as this time, and noted she is tired.  - Noted she has memory issues; she needs to repeat names again and again to remember them     Supplements    Vitamin B complex daily - not started yet   Vitamin D 2 1250 ( 50,000 units) weekly    Vitamin D3 25 mcg (1000 international unit(s)) weekly   Patient noted she has been taking also vitamin D3 with the D2  Patient wanted to do vegan B complex PA.     Today's Vitals: LMP 02/10/2025 (Approximate)   ----------------      I spent 60 minutes with this patient today. All changes were made via collaborative practice agreement with Carolynn Correa MD.     A summary of these recommendations was given to the patient.      Telemedicine Visit Details  The patient's medications can be safely assessed via a telemedicine encounter.  Type of service:  Telephone visit  Originating Location (pt. Location): Home    Distant Location (provider location):  On-site  Start Time:  08:30 AM  End Time:   09:30 AM     Medication Therapy Recommendations  Type 2 diabetes mellitus with hyperglycemia, without long-term current use of insulin (H)   1 Current Medication: sitagliptin (JANUVIA) 25 MG tablet   Current Medication Sig: Take 1 tablet (25 mg) by mouth daily.   Rationale: Patient prefers not to take - Adherence - Adherence   Recommendation: Provide Adherence  Intervention   Status: Accepted per CPA   Identified Date: 4/1/2025 Completed Date: 4/2/2025              Akbar Davis, PharmD     Medication Therapy Management (MTM) Pharmacist     [phone]     [email or web address]

## 2025-04-13 ENCOUNTER — NURSE TRIAGE (OUTPATIENT)
Dept: NURSING | Facility: CLINIC | Age: 55
End: 2025-04-13
Payer: COMMERCIAL

## 2025-04-13 ENCOUNTER — TELEPHONE (OUTPATIENT)
Dept: FAMILY MEDICINE | Facility: CLINIC | Age: 55
End: 2025-04-13
Payer: COMMERCIAL

## 2025-04-13 NOTE — TELEPHONE ENCOUNTER
Reason for Call:  Appointment Request    Patient requesting this type of appt:  Hospital/ED Follow-Up     Requested provider: Carolynn Correa    Reason patient unable to be scheduled: Not within requested timeframe    When does patient want to be seen/preferred time: 1-2 days    Comments: Per patient: needs to be seen for a hospital follow up regarding side effects from medications. Pt also states she doesn't want any anti-depressant meds prescribed, only anti-inflammation meds. Needs to be seen within 1-3 days per after visit summary    Okay to leave a detailed message?: Yes at Cell number on file:    Telephone Information:   Mobile [phone]       Call taken on 4/13/2025 at 6:27 PM by Melissa Maurice

## 2025-04-13 NOTE — TELEPHONE ENCOUNTER
"Nurse Triage SBAR    Is this a 2nd Level Triage? NO    Situation:  Lightheadedness    Background: Pt reports she feels very ill from duloxetine, took first dose 20 mg last night. Pt reports \"heavy breathing, hyperventilating, lightheaded and very, very sleepy\". Pt reports feeling \"lightheaded, weird, sleepy\". Pt able to speak very rapidly and no difficulty breathing heard by Writer. Pt initially started call stating she was going to pass out. Writer advised pt to dial 911. Pt then stated she would not call 911 and \"just want a message sent to Dr. Correa I can't take these stupid medications\". Pt reports she does have a friend with her to watch her. Pt ended call stating she would dial 911 as soon as she hung up and ended call abruptly stating \"getting worse\".      Assessment:  Psychiatric medication reaction     Protocol Recommended Disposition:   Call  Now    Recommendation: Call 911 now     Routed to provider    Does the patient meet one of the following criteria for ADS visit consideration? 16+ years old, with an [facility] PCP     TIP  Providers, please consider if this condition is appropriate for management at one of our Acute and Diagnostic Services sites.     If patient is a good candidate, please use dotphrase <dot>triageresponse and select Refer to ADS to document.    Reason for Disposition   [1] SEVERE weakness (i.e., unable to walk or barely able to walk, requires support) AND [2] new-onset or worsening     Pt reports she feels like she is going to pass out    Additional Information   Negative: [1] Intentional drug overdose AND [2] suicidal thoughts or ideas   Negative: SEVERE difficulty breathing (e.g., struggling for each breath, speaks in single words)   Negative: Shock suspected (e.g., cold/pale/clammy skin, too weak to stand, low BP, rapid pulse)   Negative: Difficult to awaken or acting confused (e.g., disoriented, slurred speech)   Negative: [1] Fainted > 15 minutes ago AND [2] still feels too " weak or dizzy to stand    Protocols used: Medication Question Call-A-AH, Weakness (Generalized) and Fatigue-A-AH

## 2025-04-14 ENCOUNTER — PATIENT OUTREACH (OUTPATIENT)
Dept: CARE COORDINATION | Facility: CLINIC | Age: 55
End: 2025-04-14
Payer: COMMERCIAL

## 2025-04-14 NOTE — LETTER
[facility] CARE COORDINATION  [address] [address]    April 14, 2025    Brayan Baires  [address]    [place] [address]      Dear Brayan,    I have been attempting to reach you since our last contact. I would like to continue to work with you and provide any additional support you may need on achieving your health care related goals. I would appreciate if you would give me a call at [phone] to let me know if you would like to continue working together. I know that there are many things that can affect our ability to communicate and I hope we can continue to work together.    We are focused on providing you with the highest-quality healthcare experience possible.    Sincerely,    Kelli Isbell, [facility]  Social Work Care Cooridinator   [facility]   Phone: [phone]

## 2025-04-14 NOTE — PROGRESS NOTES
Clinic Care Coordination Contact  [facility]/Voicemail    Clinical Data: Care Coordinator Outreach    Outreach Documentation Number of Outreach Attempt   4/14/2025   1:07 PM 2       Left message on patient's voicemail with call back information and requested return call.      Plan: Care Coordinator will send unable to contact letter with care coordinator contact information via mail. Care Coordinator will try to reach patient again in 10 business days.    Kelli Isbell [facility]   Social Work Care [facility]   Phone: [phone]

## 2025-04-15 NOTE — TELEPHONE ENCOUNTER
Writer called patient back.   Pt reported she got up not too long ago and was able to have a meal.  Recently seen at [facility] on 4/13 and 4/14.  Patient kept talking about recent visit to ED and how sob got worse after waking up in the morning and worse after antidepressant medication.  Normal breathing. No signs of sob/distress/dizziness at this time.  Scheduled ED/hosp follow up on 4/28/25 at 0840.  Pt verbalized understanding.     Future Appointments 4/15/2025 - 10/12/2025        Date Visit Type Length Department Provider     4/28/2025  9:00 AM ED/HOSP FOLLOW UP 40 min [facility]/OB Carolynn Correa MD    Location Instructions:     [facility] is located at [address] in [place], at the intersection of [facility]. This is one block south of the [facility]. Free parking is available in the lot directly north of the clinic across [facility]. The clinic is near stops along bus [address] and [address].              4/29/2025  8:40 AM NEW PATIENT (NON OB) 40 min [facility] Francoise Marie CNM              5/8/2025 12:30 PM NEW HEADACHE 60 min [facility] Argentina Hoffman APRN CNP    Location Instructions:     We are located at [address], [place], [place].&nbsp; Our building is located on the corner of [facility] [address] [facility] [address] and [place] across from [facility].                     Marcos KAN RN  [facility]

## 2025-04-15 NOTE — TELEPHONE ENCOUNTER
Called patient to schedule ED follow up. Patient began describing breathing and dizziness problems tried to transfer to triage was disconnected sent teams message to NOD.

## 2025-04-23 ENCOUNTER — TELEPHONE (OUTPATIENT)
Dept: FAMILY MEDICINE | Facility: CLINIC | Age: 55
End: 2025-04-23
Payer: COMMERCIAL

## 2025-04-23 DIAGNOSIS — E11.65 TYPE 2 DIABETES MELLITUS WITH HYPERGLYCEMIA, WITHOUT LONG-TERM CURRENT USE OF INSULIN (H): ICD-10-CM

## 2025-04-23 NOTE — TELEPHONE ENCOUNTER
Patient is calling and takes their blood sugar 10 times a day and needs a new script for test strips to get it covered because they will be out this week.

## 2025-04-23 NOTE — TELEPHONE ENCOUNTER
Patient called. Inquiring about chloraseptic prescription in pharmacy.     Tingling. Left side hand up to elbow. Tingling on and off for 30 years.   4/14/2025. Went to ER due to hyperventilation.   ER. Recommendation to be seen by ENT for jaw joint.     [place].     Patient has appointment on Monday, 4/28 at 8:40 am with Dr. Correa.     GAVINO EricN RN  [facility]

## 2025-04-23 NOTE — TELEPHONE ENCOUNTER
REFERRAL INFORMATION:  Referring By: Carolynn Correa MD  Referring Clinic:   Reason for Visit/Diagnosis: Difficulty eating [R63.8]  . Difficulty with speech [R47.9]  . Dysphagia, unspecified type [R13.10] . Ref by Carolynn Correa MD. [facility] verified    FUTURE VISIT INFORMATION:  Appointment Date: 4/28/25  Appointment Time: 7:30 AM   NOTES STATUS DETAILS   OFFICE NOTE from referring provider Internal   2/5/25- OV wCarolynn Recio MD @ [facility] DISCHARGE / ER notes Care everywhere  [facility]:  4/15/25- Nurse traige  4/14/25- ED    IMAGES *pertaining images & report*       CT, MRI, PET, NM, US, XRAYS    IMAGING  DISC TRACKING   Tracking #:    Internal -- PACS [facility]:  11/27/24- XR Chest     [facility]:  4/14/25- XR Chest      Records Received  04/24/2025 at 8:48 AM  [id number]   Facility: [facility]   Comment: Resolved 4/14/25 XR image in PACS

## 2025-04-24 ENCOUNTER — TELEPHONE (OUTPATIENT)
Dept: FAMILY MEDICINE | Facility: CLINIC | Age: 55
End: 2025-04-24

## 2025-04-24 NOTE — TELEPHONE ENCOUNTER
Received call from patient. She is currently ill and would appreciate this prescription asap--please process PA as soon as able.     Thank you!  Gabriella Archer RN BSN  [facility]

## 2025-04-24 NOTE — TELEPHONE ENCOUNTER
PA Initiation    Medication: CHLORASEPTIC SORE THROAT 6-10 MG MT LOZG  Insurance Company: [employer] - Phone [phone] Fax [phone]  Pharmacy Filling the Rx: [facility] #[id number] - [place], [place] - [address] [place] [address]  Filling Pharmacy Phone: [phone]  Filling Pharmacy Fax:    Start Date: 4/24/2025  Retail Pharmacy Prior Authorization Team   Phone: [phone]

## 2025-04-28 ENCOUNTER — PRE VISIT (OUTPATIENT)
Dept: OTOLARYNGOLOGY | Facility: CLINIC | Age: 55
End: 2025-04-28

## 2025-04-30 NOTE — TELEPHONE ENCOUNTER
Prior Authorization Approval    Medication: CHLORASEPTIC SORE THROAT 6-10 MG MT LOZG  Authorization Effective Date: 4/30/2025  Authorization Expiration Date: 4/30/2026  Approved Dose/Quantity:   Reference #:     Insurance Company: [employer] - Phone [phone] Fax [phone]  Expected CoPay: $    CoPay Card Available:      Financial Assistance Needed: No  Which Pharmacy is filling the prescription: [facility] #[id number] - [place], [place] - [address] [place] [address]  Pharmacy Notified: Yes  Patient Notified: Instructed pharmacy to notify patient once order is ready.

## 2025-05-17 DIAGNOSIS — E11.65 TYPE 2 DIABETES MELLITUS WITH HYPERGLYCEMIA, WITHOUT LONG-TERM CURRENT USE OF INSULIN (H): ICD-10-CM

## 2025-05-17 DIAGNOSIS — J06.9 URI WITH COUGH AND CONGESTION: ICD-10-CM

## 2025-05-17 NOTE — TELEPHONE ENCOUNTER
Medication Question or Refill        What medication are you calling about (include dose and sig)?: Choroceptic 5 refills    Preferred Pharmacy:   [facility] #[id number] - [place], [place] - [address] [place] [address]  Phone: [phone] Fax: [phone]      Controlled Substance Agreement on file:   CSA -- Patient Level:    CSA: None found at the patient level.       Who prescribed the medication?: Carolynn Correa     Do you need a refill? Yes      Do you have any questions or concerns?  Yes: Yes wrong medication was cancelled. She needs this one to be sent to [facility] instead of being mailed since she will need it ASA.    Could we send this information to you in [facility] or would you prefer to receive a phone call?:   Patient would prefer a phone call   Okay to leave a detailed message?: Yes at Cell number on file:    Telephone Information:   Mobile [phone]

## 2025-05-17 NOTE — TELEPHONE ENCOUNTER
Medication Question or Refill        What medication are you calling about (include dose and sig)?: B Complex Vitamins (VITAMIN-B COMPLEX) TABS     Preferred Pharmacy:  [facility] #[id number] - [place], [place] - [address] [place] [address]  Phone: [phone] Fax: [phone]      Controlled Substance Agreement on file:   CSA -- Patient Level:    CSA: None found at the patient level.       Who prescribed the medication?: pcp    Do you need a refill? Yes    When did you use the medication last? na    Patient offered an appointment? No    Do you have any questions or concerns?  No      Could we send this information to you in [employer] or would you prefer to receive a phone call?:   Patient would prefer a phone call   Okay to leave a detailed message?: Yes at Home number on file [phone] (home)

## 2025-05-18 ENCOUNTER — HEALTH MAINTENANCE LETTER (OUTPATIENT)
Age: 55
End: 2025-05-18

## 2025-05-19 ENCOUNTER — MYC REFILL (OUTPATIENT)
Dept: FAMILY MEDICINE | Facility: CLINIC | Age: 55
End: 2025-05-19

## 2025-05-19 ENCOUNTER — MYC REFILL (OUTPATIENT)
Dept: PHARMACY | Facility: CLINIC | Age: 55
End: 2025-05-19

## 2025-05-19 DIAGNOSIS — J06.9 URI WITH COUGH AND CONGESTION: ICD-10-CM

## 2025-05-19 DIAGNOSIS — E11.65 TYPE 2 DIABETES MELLITUS WITH HYPERGLYCEMIA, WITHOUT LONG-TERM CURRENT USE OF INSULIN (H): ICD-10-CM

## 2025-05-19 RX ORDER — BENZOCAINE 6 MG-MENTHOL 10 MG LOZENGES
1
Qty: 36 LOZENGE | Refills: 5 | OUTPATIENT
Start: 2025-05-19

## 2025-05-19 RX ORDER — VITAMIN B COMPLEX
1 TABLET ORAL DAILY
Qty: 90 TABLET | Refills: 2 | Status: SHIPPED | OUTPATIENT
Start: 2025-05-19

## 2025-05-19 RX ORDER — BENZOCAINE 6 MG-MENTHOL 10 MG LOZENGES
1
Qty: 36 LOZENGE | Refills: 5 | Status: CANCELLED | OUTPATIENT
Start: 2025-05-19

## 2025-05-19 RX ORDER — MAGNESIUM OXIDE 400 MG/1
400 TABLET ORAL AT BEDTIME
Qty: 90 TABLET | Refills: 3 | Status: CANCELLED | OUTPATIENT
Start: 2025-05-19

## 2025-05-19 RX ORDER — VITAMIN B COMPLEX
1 TABLET ORAL DAILY
Qty: 90 TABLET | Refills: 2 | OUTPATIENT
Start: 2025-05-19

## 2025-05-19 RX ORDER — BENZOCAINE 6 MG-MENTHOL 10 MG LOZENGES
1
Qty: 36 LOZENGE | Refills: 5 | Status: SHIPPED | OUTPATIENT
Start: 2025-05-19

## 2025-05-19 NOTE — TELEPHONE ENCOUNTER
Yes, they are not covered by insurance because of they are OTC. The questions should be directed to the insurance by patient not to pharmacy.       Thanks,  Akbar

## 2025-05-19 NOTE — TELEPHONE ENCOUNTER
Pharmacy faxed a message along with this refill request. Pharmacy message says to stop sending prescription as patient's insurance is not covered as it is a OTC item. Patient is aware but keeps arguing with pharmacy about it. Thanks!

## 2025-05-22 NOTE — TELEPHONE ENCOUNTER
Writer responded via [employer].  Karla ROBISON, BSN, PHN, RN-[facility]  [phone]

## 2025-06-12 ENCOUNTER — PATIENT OUTREACH (OUTPATIENT)
Dept: CARE COORDINATION | Facility: CLINIC | Age: 55
End: 2025-06-12
Payer: COMMERCIAL

## 2025-07-07 DIAGNOSIS — F41.9 ANXIETY: ICD-10-CM

## 2025-07-07 DIAGNOSIS — E55.9 VITAMIN D DEFICIENCY: Primary | ICD-10-CM

## 2025-07-08 RX ORDER — HYDROXYZINE PAMOATE 50 MG/1
CAPSULE ORAL
Qty: 90 CAPSULE | Refills: 0 | Status: SHIPPED | OUTPATIENT
Start: 2025-07-08

## 2025-07-09 RX ORDER — ERGOCALCIFEROL 1.25 MG/1
50000 CAPSULE, LIQUID FILLED ORAL WEEKLY
Qty: 12 CAPSULE | Refills: 0 | Status: SHIPPED | OUTPATIENT
Start: 2025-07-09

## 2025-07-10 ENCOUNTER — RESULTS FOLLOW-UP (OUTPATIENT)
Dept: MIDWIFE SERVICES | Facility: CLINIC | Age: 55
End: 2025-07-10

## 2025-07-10 ENCOUNTER — OFFICE VISIT (OUTPATIENT)
Dept: MIDWIFE SERVICES | Facility: CLINIC | Age: 55
End: 2025-07-10
Payer: COMMERCIAL

## 2025-07-10 VITALS
SYSTOLIC BLOOD PRESSURE: 120 MMHG | HEIGHT: 60 IN | HEART RATE: 76 BPM | DIASTOLIC BLOOD PRESSURE: 80 MMHG | WEIGHT: 185 LBS | BODY MASS INDEX: 36.32 KG/M2

## 2025-07-10 DIAGNOSIS — N92.6 MISSED MENSES: Primary | ICD-10-CM

## 2025-07-10 DIAGNOSIS — N93.9 ABNORMAL UTERINE BLEEDING (AUB): ICD-10-CM

## 2025-07-10 DIAGNOSIS — Z11.3 ROUTINE SCREENING FOR STI (SEXUALLY TRANSMITTED INFECTION): ICD-10-CM

## 2025-07-10 DIAGNOSIS — E11.65 TYPE 2 DIABETES MELLITUS WITH HYPERGLYCEMIA, WITHOUT LONG-TERM CURRENT USE OF INSULIN (H): ICD-10-CM

## 2025-07-10 DIAGNOSIS — N89.8 VAGINAL ITCHING: ICD-10-CM

## 2025-07-10 LAB
ALBUMIN SERPL BCG-MCNC: 4.5 G/DL (ref 3.5–5.2)
ALP SERPL-CCNC: 81 U/L (ref 40–150)
ALT SERPL W P-5'-P-CCNC: 14 U/L (ref 0–70)
ANION GAP SERPL CALCULATED.3IONS-SCNC: 12 MMOL/L (ref 7–15)
AST SERPL W P-5'-P-CCNC: 25 U/L (ref 0–45)
BILIRUB SERPL-MCNC: 0.3 MG/DL
BUN SERPL-MCNC: 11.6 MG/DL (ref 6–20)
C TRACH DNA SPEC QL PROBE+SIG AMP: NEGATIVE
CALCIUM SERPL-MCNC: 9.8 MG/DL (ref 8.8–10.4)
CHLORIDE SERPL-SCNC: 103 MMOL/L (ref 98–107)
CLUE CELLS: NORMAL
CREAT SERPL-MCNC: 0.52 MG/DL (ref 0.51–1.17)
EGFRCR SERPLBLD CKD-EPI 2021: >90 ML/MIN/1.73M2
ERYTHROCYTE [DISTWIDTH] IN BLOOD BY AUTOMATED COUNT: 11.9 % (ref 10–15)
EST. AVERAGE GLUCOSE BLD GHB EST-MCNC: 174 MG/DL
GLUCOSE SERPL-MCNC: 177 MG/DL (ref 70–99)
HBA1C MFR BLD: 7.7 % (ref 0–5.6)
HCG INTACT+B SERPL-ACNC: 1 MIU/ML
HCG UR QL: NEGATIVE
HCO3 SERPL-SCNC: 23 MMOL/L (ref 22–29)
HCT VFR BLD AUTO: 43 % (ref 35–53)
HGB BLD-MCNC: 14.6 G/DL (ref 11.7–17.7)
INTERNAL QC OK POCT: NORMAL
MCH RBC QN AUTO: 28.2 PG (ref 26.5–33)
MCHC RBC AUTO-ENTMCNC: 34 G/DL (ref 31.5–36.5)
MCV RBC AUTO: 83 FL (ref 78–100)
N GONORRHOEA DNA SPEC QL NAA+PROBE: NEGATIVE
PLATELET # BLD AUTO: 360 10E3/UL (ref 150–450)
POCT KIT EXPIRATION DATE: NORMAL
POCT KIT LOT NUMBER: NORMAL
POTASSIUM SERPL-SCNC: 4.3 MMOL/L (ref 3.4–5.3)
PROT SERPL-MCNC: 7.5 G/DL (ref 6.4–8.3)
RBC # BLD AUTO: 5.17 10E6/UL (ref 3.8–5.9)
SODIUM SERPL-SCNC: 138 MMOL/L (ref 135–145)
SPECIMEN TYPE: NORMAL
TRICHOMONAS, WET PREP: NORMAL
TSH SERPL DL<=0.005 MIU/L-ACNC: 3.41 UIU/ML (ref 0.3–4.2)
WBC # BLD AUTO: 10.2 10E3/UL (ref 4–11)
WBC'S/HIGH POWER FIELD, WET PREP: NORMAL
YEAST, WET PREP: NORMAL

## 2025-07-10 ASSESSMENT — ANXIETY QUESTIONNAIRES
5. BEING SO RESTLESS THAT IT IS HARD TO SIT STILL: SEVERAL DAYS
3. WORRYING TOO MUCH ABOUT DIFFERENT THINGS: SEVERAL DAYS
6. BECOMING EASILY ANNOYED OR IRRITABLE: MORE THAN HALF THE DAYS
2. NOT BEING ABLE TO STOP OR CONTROL WORRYING: MORE THAN HALF THE DAYS
IF YOU CHECKED OFF ANY PROBLEMS ON THIS QUESTIONNAIRE, HOW DIFFICULT HAVE THESE PROBLEMS MADE IT FOR YOU TO DO YOUR WORK, TAKE CARE OF THINGS AT HOME, OR GET ALONG WITH OTHER PEOPLE: VERY DIFFICULT
1. FEELING NERVOUS, ANXIOUS, OR ON EDGE: SEVERAL DAYS
7. FEELING AFRAID AS IF SOMETHING AWFUL MIGHT HAPPEN: MORE THAN HALF THE DAYS

## 2025-07-10 NOTE — PROGRESS NOTES
PROBLEM VISIT    Assessment:   Missed Menses  Vaginal Itching  Abnormal Uterine Bleeding  STI Screening     Plan:      1. Pelvic US ordered for abnormal uterine bleeding.   2. Blood tests: CBC, CMP, Hgb A1c, TSH with reflex to free T4, and quantitative beta HCG.  3. Cultures: GC/CT and wet prep.   4.  Urine test: UPT   5. RTC 1-2 weeks for annual physical exam with Pap smear, PRN.    30 minutes on the date of the encounter doing chart review, patient visit, and documentation      Subjective:     Brayan Baires is a 55 year old female who presents for misses menses. Requesting a pregnancy test. In a relationship for 10 years, do not live together d/t partner's alcoholism.  Last sexual intercourse with her partner was in May 2025.  Thinking she has been pregnant since May. Notes tightness in abdominal area with breast tenderness. One sexual partner for the past 10 years. Believes they are mutually monogamous. LMP 04/15/2025. Periods became irregular starting in February, took Black cohosh and got period in March noting them to be regular again. Has used this since she was young. Notes hot flashes starting in January of this year.     Mild vaginal itching that keeps coming and going for a year now, notes that it became worse this weekend. Is using a homemade natural remedy (Tea Tree Oil, Juniper, Castor Oil) to the area and feels like it is helping but not getting rid of it. No discharge or foul odor noted. Explains it to be just on the labia, not internal. Some tenderness, no bleeding.    Gynecologic History  Patient's last menstrual period was 04/15/2025 (within days).  Contraception: Moon cycle per patient (NFP)    Cancer screening:   Last Pap: Pt unable to remember.  Last mammogram: None.     OB History    Para Term  AB Living   2 1 1 0 1 1   SAB IAB Ectopic Multiple Live Births   0 0 0 0 0      # Outcome Date GA Lbr Nikko/2nd Weight Sex Type Anes PTL Lv   2 AB            1 Term    2.676 kg (5 lb 14.4 oz)           Review of Systems  General:  Denies problem  Eyes: Denies problem  Ears/Nose/Throat: Denies problem  Cardiovascular: Denies problem  Respiratory:  Denies problem  Gastrointestinal:  denies problems  Genitourinary: Mild vaginal itching, otherwise denies problems  Musculoskeletal:  Denies problem  Skin: Denies problem  Neurologic:denies problems  Psychiatric: denies problems  Endocrine: denies problems    Objective:      Vitals:    07/10/25 0745   BP: 120/80   Pulse: 76   Weight: 83.9 kg (185 lb)   Height: 1.524 m (5')     Physical Exam:  General Appearance: Alert, cooperative, no distress, appears stated age.   Pelvic: External genitalia normal without lesions or irritation. Vagina shows no lesions, inflammation, discharge or tenderness.    UPT: NEGATIVE      I was present with the student Steph Cook who participated in the service and the documentation of the note.  I have verified the history, and personally performed the physical exam and the medical decision making.  I agree with the plan as documented by the student and as edited by me.      Ivana Hough, LUH, PRIYANKA

## 2025-07-11 ENCOUNTER — HOSPITAL ENCOUNTER (OUTPATIENT)
Dept: ULTRASOUND IMAGING | Facility: HOSPITAL | Age: 55
Discharge: HOME | End: 2025-07-11
Attending: ADVANCED PRACTICE MIDWIFE | Admitting: ADVANCED PRACTICE MIDWIFE
Payer: COMMERCIAL

## 2025-07-11 DIAGNOSIS — N93.9 ABNORMAL UTERINE BLEEDING (AUB): ICD-10-CM

## 2025-07-11 PROCEDURE — 76856 US EXAM PELVIC COMPLETE: CPT

## 2025-07-14 ENCOUNTER — TELEPHONE (OUTPATIENT)
Dept: FAMILY MEDICINE | Facility: CLINIC | Age: 55
End: 2025-07-14
Payer: COMMERCIAL

## 2025-07-14 ENCOUNTER — TELEPHONE (OUTPATIENT)
Dept: MIDWIFE SERVICES | Facility: CLINIC | Age: 55
End: 2025-07-14
Payer: COMMERCIAL

## 2025-07-14 DIAGNOSIS — R93.89 ABNORMAL PELVIC ULTRASOUND: Primary | ICD-10-CM

## 2025-07-14 LAB — RADIOLOGIST FLAGS: ABNORMAL

## 2025-07-14 NOTE — TELEPHONE ENCOUNTER
Attempted to call patient and left voicemails at both numbers associated with her contact to return call to on call CNM. Referral placed to GYN for follow up of abnormal pelvic ultrasound results.     Please try to contact patient tomorrow with results and follow up plan.    LUH BashirM

## 2025-07-14 NOTE — TELEPHONE ENCOUNTER
"Brayan Baires, friend of  Anais Martinson called and demanded to know the name of the person that she had just spoke to whom \"hung up\" on her. Brayan seemed highly agitated speaking in a raised tone. I did not know who she spoke to which I let her know. She stated that she is tired of calling and nobody is telling her their name. I informed patient that it isn't always necessary for staff to give out their name for privacy. Brayan then demanded that she speak to a clinic manager. I offered to transfer her to patient relations as that is the protocol. She kept demanding to be transferred to the clinic manager because we are all corrupt and twisted. I again offered her patient relations because our clinic manager was busy. She said no that she wants to file a grievance and she only wants to do that with the clinic manager and it is her job to speak to her. I told her again, I will transfer her to patient relations because that is the protocol. I also told her that our clinic manager is busy and has many other things to do but she again speak to patient relations to file her grievance.  She then began hurling out that everyone in [place] is corrupt and twisted and that she is from [place] and has friends in the government and she has the right to speak to the clinic manager and know her name. I then told her that to file a grievance she has to go through patient relations, and then a message will be sent to the clinic manager. She said to send me to her voicemail. I then told her that I will not do that and I will transfer her to patient relations, which I did. Patient called back less than two minutes later and spoke to another .   "

## 2025-07-14 NOTE — TELEPHONE ENCOUNTER
Cleo from imaging calling in with an urgent finding:    This information has been abstracted from the  Chart  IMPRESSION:  1.  Enlarged myomatous uterus obscuring the endometrial stripe.  2.  There is also a left adnexal solid mass partially obscuring the left ovary which may reflect a large subserosal fibroid as well.  3.  Right ovary is only seen transabdominally and unremarkable.  4.  Patient needs pelvic MRI for further evaluation of the uterus and ovaries.

## 2025-07-15 NOTE — TELEPHONE ENCOUNTER
Left message to call back for: Amen  Information to relay to patient: Please transfer to nurse to relay results and need for referral to [facility]

## 2025-07-16 NOTE — TELEPHONE ENCOUNTER
Called both numbers on file to discuss findings and transfer to [facility]    No answer LVMTCB     Pt has lump adnexal solid mass that requires OBGYN follow up--- [facility]     Jocelyn Hooper RN on 7/16/2025 at 8:32 AM

## 2025-07-16 NOTE — TELEPHONE ENCOUNTER
Pt called back, RN answered questions and made sure she was scheduled for [facility] (next week Tuesday)    Jocelyn Hooper RN on 7/16/2025 at 10:37 AM

## 2025-07-21 ENCOUNTER — MYC MEDICAL ADVICE (OUTPATIENT)
Dept: FAMILY MEDICINE | Facility: CLINIC | Age: 55
End: 2025-07-21

## 2025-07-21 ENCOUNTER — MYC MEDICAL ADVICE (OUTPATIENT)
Dept: MIDWIFE SERVICES | Facility: CLINIC | Age: 55
End: 2025-07-21

## 2025-07-21 ENCOUNTER — OFFICE VISIT (OUTPATIENT)
Dept: FAMILY MEDICINE | Facility: CLINIC | Age: 55
End: 2025-07-21
Payer: COMMERCIAL

## 2025-07-21 ENCOUNTER — MYC REFILL (OUTPATIENT)
Dept: FAMILY MEDICINE | Facility: CLINIC | Age: 55
End: 2025-07-21

## 2025-07-21 VITALS
RESPIRATION RATE: 21 BRPM | SYSTOLIC BLOOD PRESSURE: 133 MMHG | HEART RATE: 85 BPM | OXYGEN SATURATION: 99 % | HEIGHT: 59 IN | BODY MASS INDEX: 37.7 KG/M2 | TEMPERATURE: 97.8 F | WEIGHT: 187 LBS | DIASTOLIC BLOOD PRESSURE: 88 MMHG

## 2025-07-21 DIAGNOSIS — Z12.11 SCREEN FOR COLON CANCER: ICD-10-CM

## 2025-07-21 DIAGNOSIS — E11.9 TYPE 2 DIABETES MELLITUS WITHOUT COMPLICATION, WITHOUT LONG-TERM CURRENT USE OF INSULIN (H): Primary | ICD-10-CM

## 2025-07-21 DIAGNOSIS — R93.89 ABNORMAL PELVIC ULTRASOUND: ICD-10-CM

## 2025-07-21 DIAGNOSIS — M54.41 CHRONIC BILATERAL LOW BACK PAIN WITH BILATERAL SCIATICA: ICD-10-CM

## 2025-07-21 DIAGNOSIS — F33.2 MAJOR DEPRESSIVE DISORDER, RECURRENT, SEVERE WITHOUT PSYCHOTIC FEATURES (H): ICD-10-CM

## 2025-07-21 DIAGNOSIS — Z12.4 CERVICAL CANCER SCREENING: ICD-10-CM

## 2025-07-21 DIAGNOSIS — E66.01 CLASS 2 SEVERE OBESITY DUE TO EXCESS CALORIES WITH SERIOUS COMORBIDITY AND BODY MASS INDEX (BMI) OF 37.0 TO 37.9 IN ADULT (H): ICD-10-CM

## 2025-07-21 DIAGNOSIS — M54.42 CHRONIC BILATERAL LOW BACK PAIN WITH BILATERAL SCIATICA: ICD-10-CM

## 2025-07-21 DIAGNOSIS — R13.10 DYSPHAGIA, UNSPECIFIED TYPE: ICD-10-CM

## 2025-07-21 DIAGNOSIS — E66.812 CLASS 2 SEVERE OBESITY DUE TO EXCESS CALORIES WITH SERIOUS COMORBIDITY AND BODY MASS INDEX (BMI) OF 37.0 TO 37.9 IN ADULT (H): ICD-10-CM

## 2025-07-21 DIAGNOSIS — R76.8 ELEVATED ANTINUCLEAR ANTIBODY (ANA) LEVEL: ICD-10-CM

## 2025-07-21 DIAGNOSIS — F03.90 MAJOR NEUROCOGNITIVE DISORDER (H): ICD-10-CM

## 2025-07-21 DIAGNOSIS — F43.10 PTSD (POST-TRAUMATIC STRESS DISORDER): ICD-10-CM

## 2025-07-21 DIAGNOSIS — Z87.09 HISTORY OF ASTHMA: ICD-10-CM

## 2025-07-21 DIAGNOSIS — G89.29 CHRONIC BILATERAL LOW BACK PAIN WITH BILATERAL SCIATICA: ICD-10-CM

## 2025-07-21 DIAGNOSIS — R68.84 JAW PAIN: ICD-10-CM

## 2025-07-21 DIAGNOSIS — R21 RASH: ICD-10-CM

## 2025-07-21 DIAGNOSIS — E55.9 VITAMIN D DEFICIENCY: ICD-10-CM

## 2025-07-21 PROBLEM — I10 PRIMARY HYPERTENSION: Status: ACTIVE | Noted: 2025-05-12

## 2025-07-21 PROBLEM — Z87.820 HISTORY OF TRAUMATIC BRAIN INJURY: Status: ACTIVE | Noted: 2024-12-06

## 2025-07-21 PROCEDURE — 99215 OFFICE O/P EST HI 40 MIN: CPT | Performed by: FAMILY MEDICINE

## 2025-07-21 PROCEDURE — 3075F SYST BP GE 130 - 139MM HG: CPT | Performed by: FAMILY MEDICINE

## 2025-07-21 PROCEDURE — G2211 COMPLEX E/M VISIT ADD ON: HCPCS | Performed by: FAMILY MEDICINE

## 2025-07-21 PROCEDURE — 3079F DIAST BP 80-89 MM HG: CPT | Performed by: FAMILY MEDICINE

## 2025-07-21 RX ORDER — MULTIVITAMIN WITH IRON
100 TABLET ORAL DAILY
Qty: 90 TABLET | Refills: 1 | Status: SHIPPED | OUTPATIENT
Start: 2025-07-21

## 2025-07-21 RX ORDER — ALBUTEROL SULFATE 90 UG/1
2 INHALANT RESPIRATORY (INHALATION) EVERY 4 HOURS PRN
Qty: 18 G | Refills: 1 | Status: SHIPPED | OUTPATIENT
Start: 2025-07-21

## 2025-07-21 RX ORDER — ERGOCALCIFEROL 1.25 MG/1
50000 CAPSULE, LIQUID FILLED ORAL WEEKLY
Qty: 12 CAPSULE | Refills: 4 | Status: SHIPPED | OUTPATIENT
Start: 2025-07-21

## 2025-07-21 ASSESSMENT — ASTHMA QUESTIONNAIRES
QUESTION_1 LAST FOUR WEEKS HOW MUCH OF THE TIME DID YOUR ASTHMA KEEP YOU FROM GETTING AS MUCH DONE AT WORK, SCHOOL OR AT HOME: A LITTLE OF THE TIME
QUESTION_2 LAST FOUR WEEKS HOW OFTEN HAVE YOU HAD SHORTNESS OF BREATH: THREE TO SIX TIMES A WEEK
QUESTION_3 LAST FOUR WEEKS HOW OFTEN DID YOUR ASTHMA SYMPTOMS (WHEEZING, COUGHING, SHORTNESS OF BREATH, CHEST TIGHTNESS OR PAIN) WAKE YOU UP AT NIGHT OR EARLIER THAN USUAL IN THE MORNING: ONCE OR TWICE
QUESTION_5 LAST FOUR WEEKS HOW WOULD YOU RATE YOUR ASTHMA CONTROL: SOMEWHAT CONTROLLED
QUESTION_4 LAST FOUR WEEKS HOW OFTEN HAVE YOU USED YOUR RESCUE INHALER OR NEBULIZER MEDICATION (SUCH AS ALBUTEROL): NOT AT ALL
ACT_TOTALSCORE: 19

## 2025-07-21 NOTE — PROGRESS NOTES
Assessment & Plan     Type 2 diabetes mellitus without complication, without long-term current use of insulin (H)   Controlled.  Addressed smoking status and aspirin therapy.  Recommended annual eye exam and dental cares. Reviewed foot cares and foot exam.  Blood pressure and lipid management reviewed today.  Vaccines reviewed and updated.  Plan for glucose management includes ongoing focus on healthy diabetic diet and increased activity, okay to stay off meds and work on LSM for now.  Recheck in 3 months. .  Labs ordered as below including:     Hemoglobin A1C   Date Value Ref Range Status   07/10/2025 7.7 (H) 0.0 - 5.6 % Final     Comment:     Normal <5.7%   Prediabetes 5.7-6.4%    Diabetes 6.5% or higher     Note: Adopted from ADA consensus guidelines.   02/05/2025 8.3 (H) 0.0 - 5.6 % Final     Comment:     Normal <5.7%   Prediabetes 5.7-6.4%    Diabetes 6.5% or higher     Note: Adopted from ADA consensus guidelines.   10/14/2024 9.3 (H) 0.0 - 5.6 % Final     Comment:     Normal <5.7%   Prediabetes 5.7-6.4%    Diabetes 6.5% or higher     Note: Adopted from ADA consensus guidelines.    ,   Lab Results   Component Value Date     (H) 10/14/2024   ,   Creatinine   Date Value Ref Range Status   07/10/2025 0.52 0.51 - 1.17 mg/dL Final     Comment:     Male and Female  0-2 Months    0.31-0.88 mg/dL  2-12 Months   0.16-0.39 mg/dL  1-2 Years     0.18-0.35 mg/dL  3-4 Years     0.26-0.42 mg/dL  5-6 Years     0.29-0.47 mg/dL  7-8 Years     0.34-0.53 mg/dL  9-10 Years    0.33-0.64 mg/dL  11-12 Years   0.44-0.68 mg/dL  13-14 Years   0.46-0.77 mg/dL    Female  15 Years and older  0.51-0.95 mg/dL    Male  15 Years and older  0.67-1.17 mg/dL            - Adult Eye  Referral    Abnormal pelvic ultrasound  Follow-up with ob/gyn this week- needs MRI and pap smear.      Screen for colon cancer  Not sure what she wants to do    Major depressive disorder, recurrent, severe without psychotic features (H)  Off meds.   "Working on getting [facility] worker     Class 2 severe obesity due to excess calories with serious comorbidity and body mass index (BMI) of 37.0 to 37.9 in adult (H)  Reviewed LSM    Cervical cancer screening  Defer to ob/gyn follow-up this week    Elevated antinuclear antibody (ASHLEY) level  Follow-up with rheumatology with [facility].  Likely fibromyalgia?   - Adult Rheumatology  Referral    PTSD (post-traumatic stress disorder)  [facility] worker indicated    Major neurocognitive disorder (H)  Ongoing nutrition support.  [facility] worker.    - vitamin B6 (PYRIDOXINE) 100 MG tablet  Dispense: 90 tablet; Refill: 1    Dysphagia, unspecified type  Follow-up with speech therapy and ENT  - Adult ENT  Referral  - Speech Therapy  Referral    Jaw pain  As above.   - Adult ENT  Referral  - Physical Therapy  Referral    Rash  Per pt request.   - Adult Dermatology  Referral    History of asthma  Pulmonary consult pending.   - albuterol (PROAIR HFA/PROVENTIL HFA/VENTOLIN HFA) 108 (90 Base) MCG/ACT inhaler  Dispense: 18 g; Refill: 1    Chronic bilateral low back pain with bilateral sciatica  Pool therapy appropriate   - Physical Therapy  Referral      BMI  Estimated body mass index is 37.7 kg/m  as calculated from the following:    Height as of this encounter: 1.5 m (4' 11.06\").    Weight as of this encounter: 84.8 kg (187 lb).   Weight management plan: Discussed healthy diet and exercise guidelines    The longitudinal plan of care for the diagnosis(es)/condition(s) as documented were addressed during this visit. Due to the added complexity in care, I will continue to support Amen in the subsequent management and with ongoing continuity of care.    47 minutes spent on the date of the encounter doing chart review, history and exam, documentation and further activities as noted above      Follow-up    Follow-up Visit   Expected date:  Oct 21, 2025 (Approximate)      Follow Up Appointment " Details:     Follow-up with whom?: Me    Follow-Up for what?: Adult Preventive    How?: In Person                 Subjective   Amen is a 55 year old, presenting for the following health issues:  RECHECK (Lot of pain around the neck )        7/21/2025     8:40 AM   Additional Questions   Roomed by m   Accompanied by self     HPI      Abnormal uterine bleeding- Pelvic us 7/11/25- needs pelvic MRI and follow-up with ob/gyn.  Normal tsh, cbc, cmp, enmanuel/chlam, wet.  Pap smear not done.  Using Black Cohash.  Very bloated.  Did get a period in March.  Spotting in Feb.  Vegan Indonesian food causes her to bleed vaginally and get her period.  Lives apart from her boyfriend but he is in [place] but he is [place] and possibly M19 and alcoholic.  Has apt tomorrow with Ob/gyn tomorrow.  Worried her partner may be eating foods that she is allergic to something in his sperm.  She wants to be a nun.      Diabetes- A1c checked this month at ob/gyn visit showing sugars under better control.      ER visit 4/2025 at [place] for pain and SOB.  Normal evaluation.  Follow-up with pcp.  Normal CXR.      MTM 4/2025 reviewed- b-complex instead of her prescribed meds. Not interested in taking prescribed meds due to side effects and wanting vitamin supplement PA because not covered by insurance.  Does not want to take duloxetine.  Does not want to take januvia.      Virtual visit for asthma 2/2025 reviewed- recommended in person visit due to symtpoms.  Pulmonary referral placed.      Last visit with me 2/2025 reviewed did not tolerate jardiance. Switched to januvia.  Did not tolerate metformin eitther.  A1c was down 9.3 to 8.3 with dietary changes and some intermittent med use.  Colonoscopy ordered.  Pap declined due to time constraints.  Ob/gyn referral placed per pt request.  Urinary incontinence reviewed.      Fibromyalgia.      Struggling to get scheduled with ENT- needs a new referral.      Struggling with memory.    Exhausted and fatigue.   "  Helping her friend with autism/asperger's and getting her support    Sharing many stories about drama in her apartment building regarding graffiti and FBI and multiple cities conspiracies she is involved in that is affecting her health and mental stress.      Has apt this week for [employer] to possibly sufficate the fibroid?      Neck and jaw.  Due for dental checkup- has follow-up in Sept.  Needs TMJ referral.  [facility].  Very sore throat- taking chloraseptic.      Having spells of jerking, left arm posturing, head deviates to the right, neck cracking, jaw cracking, gagging easily. Has neurology 9/2025     Has letter from [facility] services board.  Case management through [facility].  MND due to TBI and PTSD.  Working on getting [facility]MS with [facility]     Mammogram 8/2025     Still very itchy and noting some rash on arms, neck and ears.  Wants Derm referral.     Wants refill on B6- too expensive.      Switching to medica     Asthma only acting up with dog.  Better if she keeps her dog clean.  Doesn't have rescue inhaler.      Speech pathology- hasn't been able to schedule this.    Pool therapy.         Objective    /88 (BP Location: Left arm, Patient Position: Sitting, Cuff Size: Adult Regular)   Pulse 85   Temp 97.8  F (36.6  C) (Temporal)   Resp 21   Ht 1.5 m (4' 11.06\")   Wt 84.8 kg (187 lb)   LMP 03/12/2025 (Within Days)   SpO2 99%   BMI 37.70 kg/m    Body mass index is 37.7 kg/m .  Physical Exam   Complete 10 point ROS completed today as part of the exam and patient denies any symptoms as reviewed in HPI     Wt Readings from Last 3 Encounters:   07/21/25 84.8 kg (187 lb)   07/10/25 83.9 kg (185 lb)   02/05/25 81 kg (178 lb 8 oz)       Patient's last menstrual period was 03/12/2025 (within days).    All normal as below except abnormalities include: Appears well- here by herself today.  Evidence of healthy grooming.  Patient very talkative and history she is sharing is " diorganized and skipping topics frequently and often hard to follow by provider.    General is a  55 year old sitting comfortably in no apparent distress   HEENT:  TM are clear bilaterally.  Eye exams within normal   Neck: Supple without lymphadenopathy or thyromegally  CV: Regular rate and rhythm S1S2 without rubs, murmurs or gallops,   Lungs: Clear to auscultation bilaterally  Abd:  +BS, soft NT/ND,  No masses or organomegally,   Extremities: Warm, No Edema, 2+ Pedal and radial pulses bilaterally  Skin: No lesions or rashes noted  Neuro: Able to ambulate around the exam room with equal movement, strength and normal coordination of the upper and lower extremeties symmetrically  Pelvic:working with ob/gyn.     History summarized from1-2:as above   Old Records-1: Outside allergies, meds, problems and immunizations were reconciled as needed from [facility]  Radiology tests reviewed-1: pelvic us 2025   Lab tests reviewed-1: 2025    Carolynn Correa MD             Signed Electronically by: Carolynn Correa MD

## 2025-07-21 NOTE — PROGRESS NOTES
Abnormal uterine bleeding- Pelvic us 7/11/25- needs pelvic MRI and follow-up with ob/gyn.  Normal tsh, cbc, cmp, enmanuel/chlam, wet.  Pap smear not done     Diabetes- A1c checked this month at ob/gyn visit showing sugars under better control.      ER visit 4/2025 at [place] for pain and SOB.  Normal evaluation.  Follow-up with pcp

## 2025-07-21 NOTE — PROGRESS NOTES
Prior to immunization administration, verified patients identity using patient s name and date of birth. Please see Immunization Activity for additional information.     Screening Questionnaire for Adult Immunization    Are you sick today?   No   Do you have allergies to medications, food, a vaccine component or latex?   Yes   Have you ever had a serious reaction after receiving a vaccination?   Yes   Do you have a long-term health problem with heart, lung, kidney, or metabolic disease (e.g., diabetes), asthma, a blood disorder, no spleen, complement component deficiency, a cochlear implant, or a spinal fluid leak?  Are you on long-term aspirin therapy?   Yes   Do you have cancer, leukemia, HIV/AIDS, or any other immune system problem?   No   Do you have a parent, brother, or sister with an immune system problem?   No   In the past 3 months, have you taken medications that affect  your immune system, such as prednisone, other steroids, or anticancer drugs; drugs for the treatment of rheumatoid arthritis, Crohn s disease, or psoriasis; or have you had radiation treatments?   No   Have you had a seizure, or a brain or other nervous system problem?   Yes   During the past year, have you received a transfusion of blood or blood    products, or been given immune (gamma) globulin or antiviral drug?   No   For women: Are you pregnant or is there a chance you could become       pregnant during the next month?   No   Have you received any vaccinations in the past 4 weeks?   No     Immunization questionnaire was positive for at least one answer.  Notified .      Patient instructed to remain in clinic for 15 minutes afterwards, and to report any adverse reactions.     Screening performed by DANK Mason MA on 7/21/2025 at 8:44 AM.

## 2025-07-22 ENCOUNTER — PATIENT OUTREACH (OUTPATIENT)
Dept: CARE COORDINATION | Facility: CLINIC | Age: 55
End: 2025-07-22
Payer: COMMERCIAL

## 2025-07-24 ENCOUNTER — PATIENT OUTREACH (OUTPATIENT)
Dept: CARE COORDINATION | Facility: CLINIC | Age: 55
End: 2025-07-24
Payer: COMMERCIAL

## 2025-07-27 ENCOUNTER — MYC MEDICAL ADVICE (OUTPATIENT)
Dept: FAMILY MEDICINE | Facility: CLINIC | Age: 55
End: 2025-07-27
Payer: COMMERCIAL

## 2025-07-27 NOTE — TELEPHONE ENCOUNTER
----- Message from Arcelia Oshea sent at 7/26/2025 10:58 AM CDT -----  Regarding: Request to call a patient  Good morning Dr. Correa,  This is Arcelia, a pharmacist at [facility]. I am contacting you regarding Ms. Baires. Ms. Baires had some concerns about being tested for any auto immune conditions that could be contributing to her pain. I let her know I'd send you a message via Epic to call her or get in touch with her as she would not like to go through the Triage line. Thank you so much. Also, she requested a call first think in the mornings are best or latest in the day (between 5-6pm) and avoid middle of the day calls.  Thanks for your time and consideration,  Arcelia KAN, PharmD  [phone] (call center)  [phone] (direct line to a pharmacist)

## 2025-07-28 ENCOUNTER — TELEPHONE (OUTPATIENT)
Dept: FAMILY MEDICINE | Facility: CLINIC | Age: 55
End: 2025-07-28
Payer: COMMERCIAL

## 2025-07-28 NOTE — TELEPHONE ENCOUNTER
Good morning,  This is Arcelia, a pharmacist in the [facility] call center. I am contacting you regarding Brayan's Losartan. We had a script on file from October 2024 that we delivered to pt for Losartan 50mg. On Saturday Brayan called to ask if it was okay to take 1/2 of the 50mg tablet for a dose of 25mg daily as she wants to start out at the lowest dose possible. Would you consider sending us a new prescription from Losartan 25mg daily if you agree with this?   Thanks for your time and consideration,  Arcelia KAN, PharmD  [phone] (call center)  [phone] (direct line to a pharmacist)

## 2025-07-29 ENCOUNTER — LAB REQUISITION (OUTPATIENT)
Dept: LAB | Facility: CLINIC | Age: 55
End: 2025-07-29

## 2025-07-29 DIAGNOSIS — F41.9 ANXIETY: ICD-10-CM

## 2025-07-29 DIAGNOSIS — Z12.4 ENCOUNTER FOR SCREENING FOR MALIGNANT NEOPLASM OF CERVIX: ICD-10-CM

## 2025-07-29 PROCEDURE — 87624 HPV HI-RISK TYP POOLED RSLT: CPT | Performed by: OBSTETRICS & GYNECOLOGY

## 2025-07-30 LAB
HPV HR 12 DNA CVX QL NAA+PROBE: NEGATIVE
HPV16 DNA CVX QL NAA+PROBE: NEGATIVE
HPV18 DNA CVX QL NAA+PROBE: NEGATIVE
HUMAN PAPILLOMA VIRUS FINAL DIAGNOSIS: NORMAL

## 2025-07-31 RX ORDER — HYDROXYZINE PAMOATE 50 MG/1
CAPSULE ORAL
Qty: 90 CAPSULE | Refills: 0 | Status: CANCELLED | OUTPATIENT
Start: 2025-07-31

## 2025-08-05 ENCOUNTER — TELEPHONE (OUTPATIENT)
Dept: FAMILY MEDICINE | Facility: CLINIC | Age: 55
End: 2025-08-05
Payer: COMMERCIAL

## 2025-08-05 ENCOUNTER — MYC MEDICAL ADVICE (OUTPATIENT)
Dept: FAMILY MEDICINE | Facility: CLINIC | Age: 55
End: 2025-08-05
Payer: COMMERCIAL

## 2025-08-05 DIAGNOSIS — F41.9 ANXIETY: ICD-10-CM

## 2025-08-05 DIAGNOSIS — I10 PRIMARY HYPERTENSION: Primary | ICD-10-CM

## 2025-08-05 RX ORDER — LOSARTAN POTASSIUM 25 MG/1
25 TABLET ORAL DAILY
Qty: 90 TABLET | Refills: 0 | Status: SHIPPED | OUTPATIENT
Start: 2025-08-05

## 2025-08-07 RX ORDER — HYDROXYZINE PAMOATE 25 MG/1
25 CAPSULE ORAL 3 TIMES DAILY PRN
Qty: 90 CAPSULE | Refills: 0 | Status: SHIPPED | OUTPATIENT
Start: 2025-08-07

## 2025-08-08 ENCOUNTER — TELEPHONE (OUTPATIENT)
Dept: FAMILY MEDICINE | Facility: CLINIC | Age: 55
End: 2025-08-08
Payer: COMMERCIAL

## 2025-08-08 DIAGNOSIS — M54.2 NECK PAIN: ICD-10-CM

## 2025-08-08 DIAGNOSIS — R68.84 JAW PAIN: Primary | ICD-10-CM

## 2025-08-22 ENCOUNTER — TELEPHONE (OUTPATIENT)
Dept: FAMILY MEDICINE | Facility: CLINIC | Age: 55
End: 2025-08-22
Payer: COMMERCIAL

## 2025-08-22 DIAGNOSIS — E11.9 TYPE 2 DIABETES MELLITUS WITHOUT COMPLICATION, WITHOUT LONG-TERM CURRENT USE OF INSULIN (H): Primary | ICD-10-CM

## 2025-08-25 RX ORDER — BLOOD-GLUCOSE METER
EACH MISCELLANEOUS
Qty: 1 KIT | Refills: 0 | Status: CANCELLED | OUTPATIENT
Start: 2025-08-25

## 2025-08-26 RX ORDER — LANCETS
EACH MISCELLANEOUS
Qty: 100 EACH | Refills: 6 | Status: SHIPPED | OUTPATIENT
Start: 2025-08-26

## 2025-08-29 ENCOUNTER — TELEPHONE (OUTPATIENT)
Dept: FAMILY MEDICINE | Facility: CLINIC | Age: 55
End: 2025-08-29
Payer: COMMERCIAL

## 2025-08-29 DIAGNOSIS — E11.9 TYPE 2 DIABETES MELLITUS WITHOUT COMPLICATION, WITHOUT LONG-TERM CURRENT USE OF INSULIN (H): ICD-10-CM
